# Patient Record
Sex: FEMALE | Race: WHITE | NOT HISPANIC OR LATINO | Employment: FULL TIME | URBAN - METROPOLITAN AREA
[De-identification: names, ages, dates, MRNs, and addresses within clinical notes are randomized per-mention and may not be internally consistent; named-entity substitution may affect disease eponyms.]

---

## 2021-05-19 LAB — BMD RECOMMENDATION EXT: NORMAL

## 2022-01-18 ENCOUNTER — OFFICE VISIT (OUTPATIENT)
Dept: FAMILY MEDICINE | Facility: CLINIC | Age: 57
End: 2022-01-18
Payer: COMMERCIAL

## 2022-01-18 VITALS
RESPIRATION RATE: 14 BRPM | HEART RATE: 70 BPM | OXYGEN SATURATION: 96 % | DIASTOLIC BLOOD PRESSURE: 68 MMHG | SYSTOLIC BLOOD PRESSURE: 106 MMHG | WEIGHT: 146.81 LBS | HEIGHT: 65 IN | BODY MASS INDEX: 24.46 KG/M2

## 2022-01-18 DIAGNOSIS — Z85.3 HISTORY OF BREAST CANCER: Primary | ICD-10-CM

## 2022-01-18 DIAGNOSIS — Z13.220 ENCOUNTER FOR LIPID SCREENING FOR CARDIOVASCULAR DISEASE: ICD-10-CM

## 2022-01-18 DIAGNOSIS — K75.4 AUTOIMMUNE HEPATITIS: ICD-10-CM

## 2022-01-18 DIAGNOSIS — Z76.89 ENCOUNTER TO ESTABLISH CARE WITH NEW DOCTOR: ICD-10-CM

## 2022-01-18 DIAGNOSIS — Z13.6 ENCOUNTER FOR LIPID SCREENING FOR CARDIOVASCULAR DISEASE: ICD-10-CM

## 2022-01-18 PROCEDURE — 99999 PR PBB SHADOW E&M-NEW PATIENT-LVL IV: CPT | Mod: PBBFAC,,, | Performed by: FAMILY MEDICINE

## 2022-01-18 PROCEDURE — 99203 PR OFFICE/OUTPT VISIT, NEW, LEVL III, 30-44 MIN: ICD-10-PCS | Mod: S$GLB,,, | Performed by: FAMILY MEDICINE

## 2022-01-18 PROCEDURE — 99203 OFFICE O/P NEW LOW 30 MIN: CPT | Mod: S$GLB,,, | Performed by: FAMILY MEDICINE

## 2022-01-18 PROCEDURE — 99999 PR PBB SHADOW E&M-NEW PATIENT-LVL IV: ICD-10-PCS | Mod: PBBFAC,,, | Performed by: FAMILY MEDICINE

## 2022-01-18 RX ORDER — PREDNISONE 5 MG/1
5 TABLET ORAL DAILY
Qty: 90 TABLET | Refills: 2 | Status: SHIPPED | OUTPATIENT
Start: 2022-01-18 | End: 2022-10-04 | Stop reason: SDUPTHER

## 2022-01-18 RX ORDER — ERGOCALCIFEROL 1.25 MG/1
3000 CAPSULE ORAL
COMMUNITY
End: 2022-01-18

## 2022-01-18 RX ORDER — ANASTROZOLE 1 MG/1
TABLET ORAL
COMMUNITY
End: 2022-03-02

## 2022-01-18 RX ORDER — CETIRIZINE HYDROCHLORIDE 10 MG/1
10 TABLET ORAL
COMMUNITY
End: 2022-09-15

## 2022-01-18 RX ORDER — PREDNISONE 5 MG/1
5 TABLET ORAL DAILY
COMMUNITY
Start: 2021-11-10 | End: 2022-01-18 | Stop reason: SDUPTHER

## 2022-01-18 RX ORDER — VIT C/E/ZN/COPPR/LUTEIN/ZEAXAN 250MG-90MG
2000 CAPSULE ORAL
COMMUNITY

## 2022-01-18 NOTE — PROGRESS NOTES
"[facility] - Clinic Note    Subjective      Ms. Hernandez is a 56 y.o. female who presents to clinic to establish care.     Patient recently moved from [place].   She has a history of breast cancer in 2011. Underwent chemo and bilateral mastectomy.   Took arimidex for 5 years and then stopped med for 2-3 years and took it again for another 2 years. Has stopped it now.    She has a history of osteoporosis due to prednisone use from auto-immune hepatitis.          PMH William has a past medical history of Allergy, Autoimmune hepatitis, Bulging lumbar disc, Cancer, and Osteoporosis.   PSXH William has a past surgical history that includes Appendectomy; Tonsillectomy; and masectomy.    William's family history is not on file.   SH William reports that she has never smoked. She has never used smokeless tobacco. She reports previous alcohol use. She reports that she does not use drugs.   ALG William is allergic to amoxicillin, cefuroxime axetil, and cephalexin.   MED William has a current medication list which includes the following prescription(s): calcium carbonate, cetirizine, cholecalciferol (vitamin d3), anastrozole, and prednisone.     Review of Systems   Constitutional: Negative for activity change, appetite change, chills, fatigue and fever.   Eyes: Negative for visual disturbance.   Respiratory: Negative for cough and shortness of breath.    Cardiovascular: Negative for chest pain, palpitations and leg swelling.   Gastrointestinal: Negative for abdominal pain, nausea and vomiting.   Skin: Negative for wound.   Neurological: Negative for dizziness and headaches.   Psychiatric/Behavioral: Negative for confusion.     Objective     /68 (BP Location: Right arm, Patient Position: Sitting, BP Method: Medium (Manual))   Pulse 70   Resp 14   Ht 5' 5" (1.651 m)   Wt 66.6 kg (146 lb 12.8 oz)   SpO2 96%   BMI 24.43 kg/m²     Physical Exam   Constitutional: She appears well-developed and well-nourished.  Non-toxic appearance. " She does not appear ill. No distress.   HENT:   Head: Normocephalic and atraumatic.   Eyes: Right eye exhibits no discharge. Left eye exhibits no discharge.   Cardiovascular: Normal rate, regular rhythm, normal heart sounds, intact distal pulses and normal pulses. Exam reveals no gallop and no friction rub.   No murmur heard.  Pulmonary/Chest: Effort normal and breath sounds normal. No respiratory distress. She has no wheezes. She has no rhonchi. She has no rales.   Abdominal: Normal appearance.   Musculoskeletal:      Cervical back: Neck supple.      Right lower leg: No edema.      Left lower leg: No edema.   Lymphadenopathy:     She has no cervical adenopathy.   Neurological: She is alert.   Skin: Skin is warm and dry. Capillary refill takes less than 2 seconds. She is not diaphoretic.   Psychiatric: She has a normal mood and affect. Her behavior is normal. Mood, judgment and thought content normal.   Vitals reviewed.     Assessment/Plan     William was seen today for establish care.    Diagnoses and all orders for this visit:  -New patient and new problem to me    History of breast cancer  -     Ambulatory referral/consult to Hematology / Oncology; Future    Autoimmune hepatitis  -     Ambulatory referral/consult to Gastroenterology; Future  -     predniSONE (DELTASONE) 5 MG tablet; Take 1 tablet (5 mg total) by mouth once daily.    Encounter for lipid screening for cardiovascular disease  -     Lipid Panel; Future    Encounter to establish care with new doctor        Follow up in about 6 months (around 7/18/2022), or if symptoms worsen or fail to improve.    Future Appointments   Date Time Provider Department Center   1/26/2022  8:00 AM LAB SCHEDULE, [facility] FAMILY [facility]IC[facility]   3/4/2022 10:40 AM Tanner France MD [facility] [place] [address]   7/19/2022  3:40 PM Marcia Storm MD [facility]       Marcia Storm MD  Family Medicine  [facility]

## 2022-01-19 ENCOUNTER — LAB VISIT (OUTPATIENT)
Dept: FAMILY MEDICINE | Facility: CLINIC | Age: 57
End: 2022-01-19
Payer: COMMERCIAL

## 2022-01-19 DIAGNOSIS — Z13.220 ENCOUNTER FOR LIPID SCREENING FOR CARDIOVASCULAR DISEASE: ICD-10-CM

## 2022-01-19 DIAGNOSIS — Z13.6 ENCOUNTER FOR LIPID SCREENING FOR CARDIOVASCULAR DISEASE: ICD-10-CM

## 2022-01-19 LAB
CHOLEST SERPL-MCNC: 181 MG/DL (ref 120–199)
CHOLEST/HDLC SERPL: 3.1 {RATIO} (ref 2–5)
HDLC SERPL-MCNC: 59 MG/DL (ref 40–75)
HDLC SERPL: 32.6 % (ref 20–50)
LDLC SERPL CALC-MCNC: 104.4 MG/DL (ref 63–159)
NONHDLC SERPL-MCNC: 122 MG/DL
TRIGL SERPL-MCNC: 88 MG/DL (ref 30–150)

## 2022-01-19 PROCEDURE — 80061 LIPID PANEL: CPT | Performed by: FAMILY MEDICINE

## 2022-01-20 ENCOUNTER — TELEPHONE (OUTPATIENT)
Dept: FAMILY MEDICINE | Facility: CLINIC | Age: 57
End: 2022-01-20
Payer: COMMERCIAL

## 2022-01-20 DIAGNOSIS — K75.4 AUTOIMMUNE HEPATITIS: Primary | ICD-10-CM

## 2022-01-20 NOTE — TELEPHONE ENCOUNTER
----- Message from Camille Najera sent at 1/20/2022  3:28 PM CST -----  Regarding: Returning Call  Who Called: pt          Who Left Message for Patient: Sayda Orellana LPN          Does the patient know what this is regarding: n/a          Best Call Back Number:[phone]

## 2022-01-20 NOTE — TELEPHONE ENCOUNTER
----- Message from Marcia Storm MD sent at 1/20/2022 12:26 PM CST -----  Your cholesterol panel is within normal limits.

## 2022-01-20 NOTE — TELEPHONE ENCOUNTER
Spoke with patient, notified of WNL Lipid. Patient is inquiring about having a liver panel done. Patient states that she thinks her last one was approx June or July of 2021.

## 2022-01-21 ENCOUNTER — TELEPHONE (OUTPATIENT)
Dept: FAMILY MEDICINE | Facility: CLINIC | Age: 57
End: 2022-01-21
Payer: COMMERCIAL

## 2022-01-26 ENCOUNTER — LAB VISIT (OUTPATIENT)
Dept: FAMILY MEDICINE | Facility: CLINIC | Age: 57
End: 2022-01-26
Payer: COMMERCIAL

## 2022-01-26 DIAGNOSIS — K75.4 AUTOIMMUNE HEPATITIS: ICD-10-CM

## 2022-01-26 LAB
ALBUMIN SERPL BCP-MCNC: 3.7 G/DL (ref 3.5–5.2)
ALP SERPL-CCNC: 48 U/L (ref 55–135)
ALT SERPL W/O P-5'-P-CCNC: 31 U/L (ref 10–44)
ANION GAP SERPL CALC-SCNC: 10 MMOL/L (ref 8–16)
AST SERPL-CCNC: 25 U/L (ref 10–40)
BASOPHILS # BLD AUTO: 0.03 K/UL (ref 0–0.2)
BASOPHILS NFR BLD: 0.5 % (ref 0–1.9)
BILIRUB SERPL-MCNC: 0.5 MG/DL (ref 0.1–1)
BUN SERPL-MCNC: 13 MG/DL (ref 6–20)
CALCIUM SERPL-MCNC: 8.7 MG/DL (ref 8.7–10.5)
CHLORIDE SERPL-SCNC: 107 MMOL/L (ref 95–110)
CO2 SERPL-SCNC: 26 MMOL/L (ref 23–29)
CREAT SERPL-MCNC: 0.8 MG/DL (ref 0.5–1.4)
DIFFERENTIAL METHOD: ABNORMAL
EOSINOPHIL # BLD AUTO: 0.2 K/UL (ref 0–0.5)
EOSINOPHIL NFR BLD: 2.9 % (ref 0–8)
ERYTHROCYTE [DISTWIDTH] IN BLOOD BY AUTOMATED COUNT: 13 % (ref 11.5–14.5)
EST. GFR  (AFRICAN AMERICAN): >60 ML/MIN/1.73 M^2
EST. GFR  (NON AFRICAN AMERICAN): >60 ML/MIN/1.73 M^2
GLUCOSE SERPL-MCNC: 113 MG/DL (ref 70–110)
HCT VFR BLD AUTO: 44.2 % (ref 37–48.5)
HGB BLD-MCNC: 14.2 G/DL (ref 12–16)
IMM GRANULOCYTES # BLD AUTO: 0.01 K/UL (ref 0–0.04)
IMM GRANULOCYTES NFR BLD AUTO: 0.2 % (ref 0–0.5)
LYMPHOCYTES # BLD AUTO: 1.8 K/UL (ref 1–4.8)
LYMPHOCYTES NFR BLD: 33.5 % (ref 18–48)
MCH RBC QN AUTO: 31.6 PG (ref 27–31)
MCHC RBC AUTO-ENTMCNC: 32.1 G/DL (ref 32–36)
MCV RBC AUTO: 98 FL (ref 82–98)
MONOCYTES # BLD AUTO: 0.5 K/UL (ref 0.3–1)
MONOCYTES NFR BLD: 9.3 % (ref 4–15)
NEUTROPHILS # BLD AUTO: 3 K/UL (ref 1.8–7.7)
NEUTROPHILS NFR BLD: 53.6 % (ref 38–73)
NRBC BLD-RTO: 0 /100 WBC
PLATELET # BLD AUTO: 238 K/UL (ref 150–450)
PMV BLD AUTO: 9.4 FL (ref 9.2–12.9)
POTASSIUM SERPL-SCNC: 4.2 MMOL/L (ref 3.5–5.1)
PROT SERPL-MCNC: 6.4 G/DL (ref 6–8.4)
RBC # BLD AUTO: 4.49 M/UL (ref 4–5.4)
SODIUM SERPL-SCNC: 143 MMOL/L (ref 136–145)
WBC # BLD AUTO: 5.5 K/UL (ref 3.9–12.7)

## 2022-01-26 PROCEDURE — 80053 COMPREHEN METABOLIC PANEL: CPT | Performed by: FAMILY MEDICINE

## 2022-01-26 PROCEDURE — 85025 COMPLETE CBC W/AUTO DIFF WBC: CPT | Performed by: FAMILY MEDICINE

## 2022-02-10 ENCOUNTER — TELEPHONE (OUTPATIENT)
Dept: FAMILY MEDICINE | Facility: CLINIC | Age: 57
End: 2022-02-10
Payer: COMMERCIAL

## 2022-02-10 DIAGNOSIS — T38.0X5A STEROID-INDUCED OSTEOPOROSIS: ICD-10-CM

## 2022-02-10 DIAGNOSIS — M81.8 STEROID-INDUCED OSTEOPOROSIS: ICD-10-CM

## 2022-02-10 NOTE — TELEPHONE ENCOUNTER
----- Message from Marcia Storm MD sent at 2/10/2022  9:49 AM CST -----  Electrolytes, kidney function, liver enzymes, and blood count is within normal limits. Your sugar level is borderline but not concerning at this time.

## 2022-02-10 NOTE — TELEPHONE ENCOUNTER
Spoke to patient about referrals. onc scheduled for next month.   Needs prolia injection.   Needs appt for GI.

## 2022-03-02 ENCOUNTER — OFFICE VISIT (OUTPATIENT)
Dept: GASTROENTEROLOGY | Facility: CLINIC | Age: 57
End: 2022-03-02
Payer: COMMERCIAL

## 2022-03-02 VITALS
OXYGEN SATURATION: 98 % | HEIGHT: 65 IN | WEIGHT: 146 LBS | RESPIRATION RATE: 14 BRPM | DIASTOLIC BLOOD PRESSURE: 67 MMHG | HEART RATE: 73 BPM | BODY MASS INDEX: 24.32 KG/M2 | SYSTOLIC BLOOD PRESSURE: 115 MMHG

## 2022-03-02 DIAGNOSIS — Z90.49 HISTORY OF APPENDECTOMY: Primary | ICD-10-CM

## 2022-03-02 DIAGNOSIS — K75.4 AUTOIMMUNE HEPATITIS: ICD-10-CM

## 2022-03-02 PROCEDURE — 99204 PR OFFICE/OUTPT VISIT, NEW, LEVL IV, 45-59 MIN: ICD-10-PCS | Mod: S$GLB,,, | Performed by: INTERNAL MEDICINE

## 2022-03-02 PROCEDURE — 99999 PR PBB SHADOW E&M-EST. PATIENT-LVL V: ICD-10-PCS | Mod: PBBFAC,,, | Performed by: INTERNAL MEDICINE

## 2022-03-02 PROCEDURE — 99999 PR PBB SHADOW E&M-EST. PATIENT-LVL V: CPT | Mod: PBBFAC,,, | Performed by: INTERNAL MEDICINE

## 2022-03-02 PROCEDURE — 99204 OFFICE O/P NEW MOD 45 MIN: CPT | Mod: S$GLB,,, | Performed by: INTERNAL MEDICINE

## 2022-03-02 NOTE — PROGRESS NOTES
Subjective:       Patient ID: William Hernandez is a 57 y.o. female.    Chief Complaint: Establish Care (Pt has auto immune hepititis. Wanting to est care since moving here x 16years)    Sixteen years ago she was evaluated for abnormal liver test.  She was living in [place] and saw multiple physicians including a hepatologist.  She had a liver biopsy.  She was told she had auto immune hepatitis.  She was tried on the budesonide which did not influence her elevated liver test.  She has been on Imuran which was stopped because of  decreased white count.  She has been on prednisone and was tapered from 20 mg to 5 mg per day.  Her recent CBC and CMP was normal.  She denies indigestion heartburn hematemesis hematochezia jaundice or bleeding.  She denies abdominal pain.  She generally has daily bowel movements.  Her grandmother had liver cancer.  In 2011 she was treated for breast cancer and has been in remission after surgery and therapy..  She has developed back pain and is being treated for osteoporosis.      Allergies:  Review of patient's allergies indicates:   Allergen Reactions    Amoxicillin Rash    Cefuroxime axetil Rash    Cephalexin      Other reaction(s): GI Intolerance  UPSET STOMACH  Other reaction(s): GI Intolerance  UPSET STOMACH         Medications:    Current Outpatient Medications:     calcium carbonate 1250 MG capsule, Take 1,250 mg by mouth., Disp: , Rfl:     cetirizine (ZYRTEC) 10 MG tablet, Take 10 mg by mouth., Disp: , Rfl:     cholecalciferol, vitamin D3, (VITAMIN D3) 25 mcg (1,000 unit) capsule, Take 2,000 Units by mouth., Disp: , Rfl:     predniSONE (DELTASONE) 5 MG tablet, Take 1 tablet (5 mg total) by mouth once daily., Disp: 90 tablet, Rfl: 2    Past Medical History:   Diagnosis Date    Allergy     Autoimmune hepatitis     Bulging lumbar disc     Cancer     Osteoporosis        Past Surgical History:   Procedure Laterality Date    APPENDECTOMY      masectomy      TONSILLECTOMY            Review of Systems   Constitutional: Negative for appetite change, fever and unexpected weight change.   HENT: Negative for trouble swallowing.         No jaundice.   Respiratory: Negative for cough, shortness of breath and wheezing.         He currently she is not using alcohol.  She denies dysphagia aspiration hemoptysis chronic cough chronic sputum production or dyspnea on exertion.   Cardiovascular: Negative for chest pain.        She denies exertional chest pain or rhythm disturbance.   Gastrointestinal: Negative for abdominal distention, abdominal pain, anal bleeding, blood in stool, constipation, diarrhea, nausea and rectal pain.        The medical records from [place] have been requested.  After her hepatology evaluation.  She will need to have upper endoscopy and colonoscopy.   Musculoskeletal: Positive for back pain. Negative for neck pain.        She complains of upper back pain.  She was evaluated by the nurse surgeon for the thoracic back pain.  She was told she had a bulging disc.  She was told she has severe osteoporosis.  She has been on calcium and she takes her vitamins and is undergoing therapy    Skin: Negative for pallor and rash.   Neurological: Negative for dizziness, seizures, syncope, speech difficulty, weakness and numbness.   Hematological: Negative for adenopathy.   Psychiatric/Behavioral: Negative for confusion.       Objective:      Physical Exam  Vitals reviewed.   Constitutional:       Appearance: She is well-developed.      Comments: Well-nourished well-hydrated afebrile nonicteric white female.  She is sitting comfortably in the chair.  She is breathing normally.  She is normocephalic.  Pupils are normal.  She appears to be oriented x3 and can relate her history and answer questions appropriately.   HENT:      Head: Normocephalic.   Eyes:      Pupils: Pupils are equal, round, and reactive to light.   Neck:      Thyroid: No thyromegaly.      Trachea: No tracheal deviation.    Cardiovascular:      Rate and Rhythm: Normal rate and regular rhythm.      Heart sounds: Normal heart sounds.   Pulmonary:      Effort: Pulmonary effort is normal.      Breath sounds: Normal breath sounds.   Abdominal:      General: Bowel sounds are normal. There is no distension.      Palpations: Abdomen is soft. There is no mass.      Tenderness: There is no abdominal tenderness. There is no right CVA tenderness, left CVA tenderness, guarding or rebound.      Hernia: No hernia is present.      Comments: The abdomen is soft without tenderness masses or organomegaly.  Bowel sounds are normal   Musculoskeletal:         General: Normal range of motion.      Cervical back: Normal range of motion and neck supple.      Comments: She can ambulate normally.  She can go from the sitting the standing position without difficulty.  She can get on the exam table without difficulty or assistance.   Lymphadenopathy:      Cervical: No cervical adenopathy.   Skin:     General: Skin is warm and dry.   Neurological:      Mental Status: She is alert and oriented to person, place, and time.      Cranial Nerves: No cranial nerve deficit.   Psychiatric:         Behavior: Behavior normal.           Plan:       History of appendectomy    Autoimmune hepatitis  -     Ambulatory referral/consult to Gastroenterology  -     Hepatitis Panel, Acute; Future; Expected date: 03/02/2022  -     US Abdomen Complete; Future; Expected date: 03/02/2022  -     Ambulatory referral/consult to Hepatology; Future; Expected date: 03/09/2022    She will continue her nutritious diet with adequate fiber and  vitamins including the calcium and vitamin-D..  She continues her current medications.  She may need to see the endocrinologist or osteoporosis specialist if she continues on the prednisone.  She is referred to the hepatologist.  She will make a food diary and avoid the offending foods.

## 2022-03-04 ENCOUNTER — LAB VISIT (OUTPATIENT)
Dept: LAB | Facility: HOSPITAL | Age: 57
End: 2022-03-04
Attending: FAMILY MEDICINE
Payer: COMMERCIAL

## 2022-03-04 ENCOUNTER — OFFICE VISIT (OUTPATIENT)
Dept: HEMATOLOGY/ONCOLOGY | Facility: CLINIC | Age: 57
End: 2022-03-04
Payer: COMMERCIAL

## 2022-03-04 ENCOUNTER — TELEPHONE (OUTPATIENT)
Dept: HEMATOLOGY/ONCOLOGY | Facility: CLINIC | Age: 57
End: 2022-03-04

## 2022-03-04 VITALS
SYSTOLIC BLOOD PRESSURE: 115 MMHG | BODY MASS INDEX: 23.78 KG/M2 | DIASTOLIC BLOOD PRESSURE: 68 MMHG | HEART RATE: 73 BPM | HEIGHT: 66 IN | OXYGEN SATURATION: 99 % | WEIGHT: 148 LBS

## 2022-03-04 DIAGNOSIS — K75.4 AUTOIMMUNE HEPATITIS: ICD-10-CM

## 2022-03-04 DIAGNOSIS — T38.6X5A OSTEOPOROSIS DUE TO AROMATASE INHIBITOR: ICD-10-CM

## 2022-03-04 DIAGNOSIS — M81.8 OSTEOPOROSIS DUE TO AROMATASE INHIBITOR: ICD-10-CM

## 2022-03-04 DIAGNOSIS — Z85.3 HISTORY OF BREAST CANCER: ICD-10-CM

## 2022-03-04 DIAGNOSIS — Z85.3 HISTORY OF BREAST CANCER: Primary | ICD-10-CM

## 2022-03-04 DIAGNOSIS — Z79.52 CURRENT CHRONIC USE OF SYSTEMIC STEROIDS: ICD-10-CM

## 2022-03-04 DIAGNOSIS — N89.8 VAGINAL DRYNESS: Primary | ICD-10-CM

## 2022-03-04 DIAGNOSIS — N94.19 DYSPAREUNIA DUE TO MEDICAL CONDITION IN FEMALE: ICD-10-CM

## 2022-03-04 LAB
ALBUMIN SERPL BCP-MCNC: 4 G/DL (ref 3.5–5.2)
ALP SERPL-CCNC: 59 U/L (ref 55–135)
ALT SERPL W/O P-5'-P-CCNC: 35 U/L (ref 10–44)
ANION GAP SERPL CALC-SCNC: 10 MMOL/L (ref 8–16)
AST SERPL-CCNC: 28 U/L (ref 10–40)
BASOPHILS # BLD AUTO: 0.04 K/UL (ref 0–0.2)
BASOPHILS NFR BLD: 0.7 % (ref 0–1.9)
BILIRUB SERPL-MCNC: 0.4 MG/DL (ref 0.1–1)
BUN SERPL-MCNC: 17 MG/DL (ref 6–20)
CALCIUM SERPL-MCNC: 9.5 MG/DL (ref 8.7–10.5)
CHLORIDE SERPL-SCNC: 107 MMOL/L (ref 95–110)
CO2 SERPL-SCNC: 26 MMOL/L (ref 23–29)
CREAT SERPL-MCNC: 0.8 MG/DL (ref 0.5–1.4)
DIFFERENTIAL METHOD: ABNORMAL
EOSINOPHIL # BLD AUTO: 0.1 K/UL (ref 0–0.5)
EOSINOPHIL NFR BLD: 1.1 % (ref 0–8)
ERYTHROCYTE [DISTWIDTH] IN BLOOD BY AUTOMATED COUNT: 13.2 % (ref 11.5–14.5)
EST. GFR  (AFRICAN AMERICAN): >60 ML/MIN/1.73 M^2
EST. GFR  (NON AFRICAN AMERICAN): >60 ML/MIN/1.73 M^2
GLUCOSE SERPL-MCNC: 91 MG/DL (ref 70–110)
HCT VFR BLD AUTO: 44 % (ref 37–48.5)
HGB BLD-MCNC: 14 G/DL (ref 12–16)
IMM GRANULOCYTES # BLD AUTO: 0.02 K/UL (ref 0–0.04)
IMM GRANULOCYTES NFR BLD AUTO: 0.4 % (ref 0–0.5)
LDH SERPL L TO P-CCNC: 162 U/L (ref 110–260)
LYMPHOCYTES # BLD AUTO: 1 K/UL (ref 1–4.8)
LYMPHOCYTES NFR BLD: 17.4 % (ref 18–48)
MCH RBC QN AUTO: 31.6 PG (ref 27–31)
MCHC RBC AUTO-ENTMCNC: 31.8 G/DL (ref 32–36)
MCV RBC AUTO: 99 FL (ref 82–98)
MONOCYTES # BLD AUTO: 0.3 K/UL (ref 0.3–1)
MONOCYTES NFR BLD: 6.2 % (ref 4–15)
NEUTROPHILS # BLD AUTO: 4.1 K/UL (ref 1.8–7.7)
NEUTROPHILS NFR BLD: 74.2 % (ref 38–73)
NRBC BLD-RTO: 0 /100 WBC
PLATELET # BLD AUTO: 255 K/UL (ref 150–450)
PMV BLD AUTO: 9 FL (ref 9.2–12.9)
POTASSIUM SERPL-SCNC: 4.4 MMOL/L (ref 3.5–5.1)
PROT SERPL-MCNC: 6.9 G/DL (ref 6–8.4)
RBC # BLD AUTO: 4.43 M/UL (ref 4–5.4)
SODIUM SERPL-SCNC: 143 MMOL/L (ref 136–145)
WBC # BLD AUTO: 5.52 K/UL (ref 3.9–12.7)

## 2022-03-04 PROCEDURE — 99205 PR OFFICE/OUTPT VISIT, NEW, LEVL V, 60-74 MIN: ICD-10-PCS | Mod: S$GLB,,, | Performed by: INTERNAL MEDICINE

## 2022-03-04 PROCEDURE — 83615 LACTATE (LD) (LDH) ENZYME: CPT | Performed by: INTERNAL MEDICINE

## 2022-03-04 PROCEDURE — 99999 PR PBB SHADOW E&M-EST. PATIENT-LVL V: CPT | Mod: PBBFAC,,, | Performed by: INTERNAL MEDICINE

## 2022-03-04 PROCEDURE — 99999 PR PBB SHADOW E&M-EST. PATIENT-LVL V: ICD-10-PCS | Mod: PBBFAC,,, | Performed by: INTERNAL MEDICINE

## 2022-03-04 PROCEDURE — 80053 COMPREHEN METABOLIC PANEL: CPT | Performed by: INTERNAL MEDICINE

## 2022-03-04 PROCEDURE — 36415 COLL VENOUS BLD VENIPUNCTURE: CPT | Performed by: INTERNAL MEDICINE

## 2022-03-04 PROCEDURE — 85025 COMPLETE CBC W/AUTO DIFF WBC: CPT | Performed by: INTERNAL MEDICINE

## 2022-03-04 PROCEDURE — 99205 OFFICE O/P NEW HI 60 MIN: CPT | Mod: S$GLB,,, | Performed by: INTERNAL MEDICINE

## 2022-03-04 NOTE — PROGRESS NOTES
Chief complaint:  History of breast carcinoma    History of present illness:  Patient is a 57-year-old white female diagnosed with triple positive right breast carcinoma in . Patient had T1, N1, M0 disease and was treated with 6 cycles of neoadjuvant TCH.  Patient went on to have bilateral mastectomy.  I am unaware as to what her postoperative surgical pathology revealed.  Patient has had bilateral implant reconstruction x2.  The 2nd surgery was performed due to poor cosmetic outcome following the 1st reconstruction.  Patient reports she had pathologic CR both in the breast and the lymph nodes.  Patient did not receive postoperative adjuvant XRT.  She did complete 1 year of Herceptin and 10 years of Arimidex.  Patient has developed osteoporosis after therapy and is chronically on calcium supplementation with vitamin-D as well as biannual Prolia.  Last bone mineral density was obtained approximately 6 months ago and was consistent with osteoporosis.  Patient has severe difficulty with vaginal dryness and dyspareunia despite use of lubricants such as Replens as well as 3 times per week Premarin cream.     Past medical history:  1. Breast carcinoma as detailed above  2. Steroid induced osteoporosis  3. Autoimmune hepatitis  4. Status post   5. Status post endometrial ablation  6. Status post tubal ligation  7. Status post appendectomy  8. Status post tonsillectomy    Allergies:  1. Beta lactams    Medications:  1. Calcium carbonate 1250 mg p.o. daily  2. Zyrtec 10 mg p.o. daily  3. Vitamin-D 2000 units p.o. daily  4. Prednisone 5 mg p.o. daily  5. Premarin cream topically 3 times per week    Family/social history:  No ongoing tobacco or alcohol abuse.  No family history of breast or ovarian carcinoma.    Patient's paternal grandmother suffered from pancreatic carcinoma.  Patient's maternal grandfather suffer from esophageal carcinoma.    Physical examination:  Well-developed, well-nourished, white female,  no acute distress, who has a weight of 148 lb.  VITAL SIGNS: Documented  and reviewed this visit.  HEENT: Normocephalic, atraumatic. Oral mucosa pink and moist. Lips without lesions. Tongue midline. Oropharynx clear. Nonicteric sclerae.   NECK: Supple, no adenopathy. No carotid bruits, thyromegaly or thyroid nodule.   HEART: Regular rate and rhythm without murmur, gallop or rub.   LUNGS: Clear to auscultation bilaterally. Normal respiratory effort.   ABDOMEN: Soft, nontender, nondistended with positive normoactive bowel sounds, no hepatosplenomegaly.   EXTREMITIES: No cyanosis, clubbing or edema. Distal pulses are intact.   AXILLAE AND GROIN: No palpable pathologic lymphadenopathy is appreciated.   SKIN: Intact/turgor normal   NEUROLOGIC: Cranial nerves II-XII grossly intact. Motor: Good muscle bulk and tone. Strength/sensory 5/5 throughout. Gait stable.   BREASTS:  Both surgically reconstructed with implants and free from signs of local recurrence.    Laboratory:  Most recent studies were obtained 01/26/2022.  White count 5.5, hemoglobin 14.2, hematocrit 44.2, platelets 238, absolute neutrophil count 3000.  Sodium 143, potassium 4.2, chloride 107, CO2 26, BUN 13, creatinine 0.8, glucose 113, calcium 8.7, liver function test within normal limits, GFR is greater than 60.    Impression:  1. History of triple positive right breast carcinoma-no evidence of active disease.  2. Vaginal dryness/dyspareunia following long-term use of aromatase inhibitor.  3. Autoimmune hepatitis-steroid dependent.  4. Osteoporosis-on calcium supplementation/vitamin-D/biannual Prolia.      Plan:  1. Baseline studies to include CBC, CMP, LDH, chest x-ray now and review results by phone.    2. Return to clinic in 1 year with interval CBC, CMP, LDH, and chest x-ray.  3. Refer to Dr. Yoanna Lobo for long-term Gynecology care following treatment of breast carcinoma.  4. Continue calcium supplementation with vitamin-D/Prolia for management  of osteoporosis.    This note was created using voice recognition software and may contain grammatical errors.

## 2022-03-04 NOTE — LETTER
March 4, 2022        Marcia Storm MD  [address] [facility] [place] [address]             [facility]  [address] [place] [address]  Phone: [phone]   Patient: William Hernandez   MR Number: [id number]   YOB: 1965   Date of Visit: 3/4/2022       Dear Dr. Storm:    Thank you for referring William Hernandez to me for evaluation of history of triple positive right breast cancer. Below are the relevant portions of my assessment and plan of care.  If you have questions, please do not hesitate to call me. I look forward to following William along with you.    Sincerely,      Tanner France MD           CC  No Recipients

## 2022-03-04 NOTE — Clinical Note
Obtain stat CBC, CMP, LDH, and chest x-ray.  Phone review results. Return to clinic in 1 year with interval CBC, CMP, LDH, chest x-ray. Refer to Dr. Yoanna Lobo Gynecology in [facility] for long-term care following treatment of breast cancer.

## 2022-03-07 ENCOUNTER — HOSPITAL ENCOUNTER (OUTPATIENT)
Dept: RADIOLOGY | Facility: HOSPITAL | Age: 57
Discharge: HOME | End: 2022-03-07
Attending: INTERNAL MEDICINE
Payer: COMMERCIAL

## 2022-03-07 DIAGNOSIS — Z85.3 HISTORY OF BREAST CANCER: ICD-10-CM

## 2022-03-07 PROCEDURE — 71046 X-RAY EXAM CHEST 2 VIEWS: CPT | Mod: TC

## 2022-03-07 PROCEDURE — 71046 X-RAY EXAM CHEST 2 VIEWS: CPT | Mod: 26,,, | Performed by: RADIOLOGY

## 2022-03-07 PROCEDURE — 71046 XR CHEST PA AND LATERAL: ICD-10-PCS | Mod: 26,,, | Performed by: RADIOLOGY

## 2022-03-08 ENCOUNTER — TELEPHONE (OUTPATIENT)
Dept: HEMATOLOGY/ONCOLOGY | Facility: CLINIC | Age: 57
End: 2022-03-08
Payer: COMMERCIAL

## 2022-03-09 ENCOUNTER — TELEPHONE (OUTPATIENT)
Dept: HEMATOLOGY/ONCOLOGY | Facility: CLINIC | Age: 57
End: 2022-03-09
Payer: COMMERCIAL

## 2022-03-09 NOTE — TELEPHONE ENCOUNTER
Called Dr Yoanna Lobo office to inquire if they received referral that was faxed.  stated she had not. Pt's insurance info provided. She stated she would call pt and get her scheduled.

## 2022-03-24 ENCOUNTER — TELEPHONE (OUTPATIENT)
Dept: HEMATOLOGY/ONCOLOGY | Facility: CLINIC | Age: 57
End: 2022-03-24
Payer: COMMERCIAL

## 2022-03-24 NOTE — TELEPHONE ENCOUNTER
----- Message from Lupe Turk MA sent at 3/24/2022  3:54 PM CDT -----  Contact: BRENDA TAMEZ [[id number]]  Type: Needs Medical Advice    Who Called: BRENDA TAMEZ [[id number]]  Best Call Back Number: [phone]  Inquiry/Question: Would you kindly call BRENDA TAMEZ [[id number]] regarding missed call from the clinic  Thank you~

## 2022-03-25 ENCOUNTER — TELEPHONE (OUTPATIENT)
Dept: HEMATOLOGY/ONCOLOGY | Facility: CLINIC | Age: 57
End: 2022-03-25
Payer: COMMERCIAL

## 2022-03-25 NOTE — TELEPHONE ENCOUNTER
Called pt per MD orders. Pt v/u of normal findings. Pt made aware of upcoming scheduled appts for next year.     ----- Message from Tanner France MD sent at 3/25/2022  7:51 AM CDT -----  Regarding: RE: test results  Reviewed; please call and let her know that lab an cxr looked fine; keep one year follow up as planned.  ----- Message -----  From: Fara Parra RN  Sent: 3/24/2022   4:12 PM CDT  To: Tanner France MD  Subject: test results                                     Please phone call pt to review labs and CXR when you get a moment. Thank you.

## 2022-03-30 ENCOUNTER — HOSPITAL ENCOUNTER (OUTPATIENT)
Dept: RADIOLOGY | Facility: HOSPITAL | Age: 57
Discharge: HOME | End: 2022-03-30
Attending: INTERNAL MEDICINE
Payer: COMMERCIAL

## 2022-03-30 ENCOUNTER — INFUSION (OUTPATIENT)
Dept: INFUSION THERAPY | Facility: HOSPITAL | Age: 57
End: 2022-03-30
Attending: INTERNAL MEDICINE
Payer: COMMERCIAL

## 2022-03-30 VITALS
HEIGHT: 66 IN | DIASTOLIC BLOOD PRESSURE: 60 MMHG | SYSTOLIC BLOOD PRESSURE: 133 MMHG | HEART RATE: 65 BPM | OXYGEN SATURATION: 98 % | HEART RATE: 65 BPM | OXYGEN SATURATION: 98 % | WEIGHT: 145.5 LBS | RESPIRATION RATE: 20 BRPM | TEMPERATURE: 98 F | BODY MASS INDEX: 23.38 KG/M2 | TEMPERATURE: 98 F | RESPIRATION RATE: 20 BRPM | SYSTOLIC BLOOD PRESSURE: 133 MMHG | DIASTOLIC BLOOD PRESSURE: 66 MMHG

## 2022-03-30 DIAGNOSIS — K75.4 AUTOIMMUNE HEPATITIS: ICD-10-CM

## 2022-03-30 DIAGNOSIS — T38.6X5A OSTEOPOROSIS DUE TO AROMATASE INHIBITOR: Primary | ICD-10-CM

## 2022-03-30 DIAGNOSIS — M81.8 OSTEOPOROSIS DUE TO AROMATASE INHIBITOR: Primary | ICD-10-CM

## 2022-03-30 PROCEDURE — 76700 US ABDOMEN COMPLETE: ICD-10-PCS | Mod: 26,,, | Performed by: RADIOLOGY

## 2022-03-30 PROCEDURE — 76700 US EXAM ABDOM COMPLETE: CPT | Mod: 26,,, | Performed by: RADIOLOGY

## 2022-03-30 PROCEDURE — 96372 THER/PROPH/DIAG INJ SC/IM: CPT

## 2022-03-30 PROCEDURE — 76700 US EXAM ABDOM COMPLETE: CPT | Mod: TC

## 2022-03-30 PROCEDURE — 63600175 PHARM REV CODE 636 W HCPCS: Mod: JG | Performed by: FAMILY MEDICINE

## 2022-03-30 RX ADMIN — DENOSUMAB 60 MG: 60 INJECTION SUBCUTANEOUS at 08:03

## 2022-03-30 NOTE — PLAN OF CARE
"Problem: Adult Inpatient Plan of Care  Goal: Plan of Care Review  Outcome: Ongoing, Progressing  Flowsheets (Taken 3/30/2022 0854)  Plan of Care Reviewed With: patient  /66 (Patient Position: Sitting)   Pulse 65   Temp 98.1 °F (36.7 °C)   Resp 20   Ht 5' 6" (1.676 m)   Wt 66 kg (145 lb 8.1 oz)   SpO2 98%   BMI 23.48 kg/m²   Patient arrived to OPT for Prolia Injection and identified by name and  with appropriate and oriented behavior observed. VSS and all questions and concerns addressed, and next appointment for Prolia injection scheduled in 6 months. Prolia administered to left upper arm, band-aide applied and patient tolerated well. Patient discharged from OPT per self, and no acute or respiratory distress observed.     "

## 2022-05-31 ENCOUNTER — PATIENT MESSAGE (OUTPATIENT)
Dept: ADMINISTRATIVE | Facility: HOSPITAL | Age: 57
End: 2022-05-31
Payer: COMMERCIAL

## 2022-08-24 ENCOUNTER — PATIENT MESSAGE (OUTPATIENT)
Dept: ADMINISTRATIVE | Facility: HOSPITAL | Age: 57
End: 2022-08-24
Payer: COMMERCIAL

## 2022-09-01 ENCOUNTER — PATIENT MESSAGE (OUTPATIENT)
Dept: HEMATOLOGY/ONCOLOGY | Facility: CLINIC | Age: 57
End: 2022-09-01
Payer: COMMERCIAL

## 2022-09-14 NOTE — PROGRESS NOTES
"   [facility] Consultation Note    Reason for Consult:  The encounter diagnosis was Autoimmune hepatitis.    PCP: Marcia Storm   [address] / [place] [address]    HPI:  This is a 57 y.o. female here for evaluation of:  Autoimmune hepatitis    No outside records available for this referral.  In Care Everywhere, there is a mention of liver biopsy in 2013 but report not available.  Has had 3-4 subsequent biopsies.  They have shown improvement, but patient needs to get reports from [place].     Care Everywhere:   CT report of liver biopsy procedure is in [facility], but no biopsy report  Labs are from 2017, 2018:  ALT 53-60. AST normal.    Results of labs on 3/4/22 in epic show following:  LFTs normal, Plt ct normal.      Ultrasound March 30, 2022:   1. Normal gallbladder ultrasound without evidence for cholelithiasis or cholecystitis.  2. Small bilateral renal cysts.  3. Small nonobstructing left renal calculus.    Interviewed after above info obtained from records:  Has had AIH x 17 yrs (2005), and has been on prednisone for 17 yrs.  Was tried on Imuran but had leucopenia, requiring stopping discontinuation of imuran.  Dose of pred was 20 mg initially, then was tapered down to 5 mg daily, for the last 4 yrs, on 10 mg/d for 15 yrs, and 20 taper over initial 2 yrs.   Patient had "really bad virus with URI" just before discovery  of AIH.  Had delivered baby 4 yrs prior to dx of AIH.  Has taken abx for cystitis, could be nitrofurantoin during pregnancy, did not take aldomet, never on statin.       Elevated liver enzymes: Yes -historically both AST and ALT elevations, but of late, ALT elevation with normal AST  Abnormal imaging: No  Cirrhosis: No  Hepatitis C: No  Hepatitis B: No  Fatty liver: No  Encephalopathy: No  Post-hospital discharge: No  Symptoms: None    Primary hepatic manifestations:  Fatigue:No  Edema:No  Ascites:No  Encephalopathy:No  Abdominal pain:Yes initial presentation of AIH, " gone for a long time  GI bleeds: No  Pruritus:Yes - was terrible with onset of AIH, stopped when prednisone started  Weight Changes:No  Changes in Bowel habits: No  Muscle cramps:No    Risk factors for liver disease:  No jaundice  No transfusions  No IVDU  Did not snort cocaine or similar agents  Did not live with anyone with hepatitis B or C  Sexual partner not tested  hepatotoxic medications - possibly nitrofurantoin  No exposure to industrial toxins  Alcohol: None.       ROS:  Constitutional: No fevers, chills, weight changes, fatigue  ENT: No allergies, nosebleeds,   CV: No chest pain  Pulm: No cough, shortness of breath  Ophtho: No vision changes  GI/Liver: see HPI  Derm: No rash, itching  Heme: No swollen glands, bruising  MSK: No joint pains, joint swelling  : No dysuria, hematuria, decrease in urine output  Endo: No hot or cold intolerance  Neuro: No confusion, disorientation, difficulty with sleep, memory, concentration, syncope, seizure  Psych: No anxiety, depression    Medical History:  has a past medical history of Allergy, Autoimmune hepatitis, Bulging lumbar disc, Cancer, and Osteoporosis.    Surgical History:  has a past surgical history that includes Appendectomy; Tonsillectomy; and masectomy.    Family History: family history includes Irritable bowel syndrome in her mother..     Social History:  reports that she has never smoked. She has never used smokeless tobacco. She reports that she does not currently use alcohol. She reports that she does not use drugs.    Review of patient's allergies indicates:   Allergen Reactions    Amoxicillin Rash    Cefuroxime axetil Rash    Cephalexin      Other reaction(s): GI Intolerance  UPSET STOMACH  Other reaction(s): GI Intolerance  UPSET STOMACH         Current Outpatient Rx   Medication Sig Dispense Refill    calcium carbonate 1250 MG capsule Take 1,250 mg by mouth.      cholecalciferol, vitamin D3, (VITAMIN D3) 25 mcg (1,000 unit) capsule Take 2,000 Units  "by mouth.      loratadine 10 mg Cap       multivitamin capsule Take 1 capsule by mouth once daily.      predniSONE (DELTASONE) 5 MG tablet Take 1 tablet (5 mg total) by mouth once daily. 90 tablet 2    vit C/Zn gluc/herbal no.325 (ELDERBERRY ZINC VIT C MM)          Objective Findings:    Vital Signs:  /73 (BP Location: Left arm, Patient Position: Sitting, BP Method: Medium (Automatic))   Pulse 71   Temp 98.3 °F (36.8 °C) (Oral)   Resp 18   Ht 5' 6" (1.676 m)   Wt 66.6 kg (146 lb 13.2 oz)   SpO2 99%   BMI 23.70 kg/m²   Body mass index is 23.7 kg/m².    Physical Exam:  General Appearance: Well appearing in no acute distress  Head:   Normocephalic, without obvious abnormality  Eyes:    No scleral icterus, EOMI  ENT: Neck supple, Lips, mucosa, and tongue normal; teeth and gums normal  Lungs: CTA bilaterally in anterior and posterior fields, no wheezes, no crackles.  Heart:  Regular rate and rhythm, S1, S2 normal, no murmurs heard  Abdomen: Soft, non tender, non distended with positive bowel sounds in all four quadrants. No hepatosplenomegaly, ascites, or mass  Extremities: 2+ pulses, no clubbing, cyanosis or edema  Skin: No rash  Neurologic: CN II-XII intact, alert, oriented x 3. No asterixis      Labs:  Lab Results   Component Value Date    WBC 5.52 03/04/2022    HGB 14.0 03/04/2022    HCT 44.0 03/04/2022     03/04/2022    CHOL 181 01/19/2022    TRIG 88 01/19/2022    HDL 59 01/19/2022    CREATININE 0.8 03/04/2022    BUN 17 03/04/2022    BILITOT 0.4 03/04/2022    ALT 35 03/04/2022    AST 28 03/04/2022    ALKPHOS 59 03/04/2022     03/04/2022    K 4.4 03/04/2022     03/04/2022    CO2 26 03/04/2022       Imaging:       Endoscopy:      Assessment:  1. Autoimmune hepatitis    May continue the prednisone, for now.   Get biopsy slides from [place] (latest 2013 is a must, but all others would be great), and reports of biopsies.    Fibroscan yearly. - get old report from " [place]      Recommendations:  -  Fibroscan yearly, start now  -  Labs every 6 months, start now:  CBC, CMP, PT INR  -  Continue current meds  -  Avoid alcohol, smoking, sedatives and meds with codeine.  -  Avoid high intake of Tylenol (more than 4 extra-strength pills in one day)  -  Return in 6 months.       No follow-ups on file.      Order summary:  No orders of the defined types were placed in this encounter.      Thank you so much for allowing me to participate in the care of William Swift MD

## 2022-09-15 ENCOUNTER — LAB VISIT (OUTPATIENT)
Dept: FAMILY MEDICINE | Facility: CLINIC | Age: 57
End: 2022-09-15
Payer: COMMERCIAL

## 2022-09-15 ENCOUNTER — OFFICE VISIT (OUTPATIENT)
Dept: HEPATOLOGY | Facility: CLINIC | Age: 57
End: 2022-09-15
Payer: COMMERCIAL

## 2022-09-15 VITALS
DIASTOLIC BLOOD PRESSURE: 73 MMHG | OXYGEN SATURATION: 99 % | HEART RATE: 71 BPM | SYSTOLIC BLOOD PRESSURE: 113 MMHG | HEIGHT: 66 IN | RESPIRATION RATE: 18 BRPM | TEMPERATURE: 98 F | WEIGHT: 146.81 LBS | BODY MASS INDEX: 23.59 KG/M2

## 2022-09-15 DIAGNOSIS — K75.4 AUTOIMMUNE HEPATITIS: ICD-10-CM

## 2022-09-15 LAB
ALBUMIN SERPL BCP-MCNC: 3.7 G/DL (ref 3.5–5.2)
ALP SERPL-CCNC: 57 U/L (ref 55–135)
ALT SERPL W/O P-5'-P-CCNC: 30 U/L (ref 10–44)
ANION GAP SERPL CALC-SCNC: 10 MMOL/L (ref 8–16)
AST SERPL-CCNC: 26 U/L (ref 10–40)
BASOPHILS # BLD AUTO: 0.03 K/UL (ref 0–0.2)
BASOPHILS NFR BLD: 0.5 % (ref 0–1.9)
BILIRUB SERPL-MCNC: 0.4 MG/DL (ref 0.1–1)
BUN SERPL-MCNC: 15 MG/DL (ref 6–20)
CALCIUM SERPL-MCNC: 9.1 MG/DL (ref 8.7–10.5)
CHLORIDE SERPL-SCNC: 106 MMOL/L (ref 95–110)
CO2 SERPL-SCNC: 25 MMOL/L (ref 23–29)
CREAT SERPL-MCNC: 0.8 MG/DL (ref 0.5–1.4)
DIFFERENTIAL METHOD: ABNORMAL
EOSINOPHIL # BLD AUTO: 0 K/UL (ref 0–0.5)
EOSINOPHIL NFR BLD: 0.5 % (ref 0–8)
ERYTHROCYTE [DISTWIDTH] IN BLOOD BY AUTOMATED COUNT: 13.2 % (ref 11.5–14.5)
EST. GFR  (NO RACE VARIABLE): >60 ML/MIN/1.73 M^2
GLUCOSE SERPL-MCNC: 192 MG/DL (ref 70–110)
HCT VFR BLD AUTO: 42.2 % (ref 37–48.5)
HGB BLD-MCNC: 13.7 G/DL (ref 12–16)
IMM GRANULOCYTES # BLD AUTO: 0.02 K/UL (ref 0–0.04)
IMM GRANULOCYTES NFR BLD AUTO: 0.3 % (ref 0–0.5)
INR PPP: 1.2 (ref 0.8–1.2)
LYMPHOCYTES # BLD AUTO: 1.2 K/UL (ref 1–4.8)
LYMPHOCYTES NFR BLD: 17.9 % (ref 18–48)
MCH RBC QN AUTO: 31.1 PG (ref 27–31)
MCHC RBC AUTO-ENTMCNC: 32.5 G/DL (ref 32–36)
MCV RBC AUTO: 96 FL (ref 82–98)
MONOCYTES # BLD AUTO: 0.3 K/UL (ref 0.3–1)
MONOCYTES NFR BLD: 4.6 % (ref 4–15)
NEUTROPHILS # BLD AUTO: 5 K/UL (ref 1.8–7.7)
NEUTROPHILS NFR BLD: 76.2 % (ref 38–73)
NRBC BLD-RTO: 0 /100 WBC
PLATELET # BLD AUTO: 240 K/UL (ref 150–450)
PMV BLD AUTO: 9.5 FL (ref 9.2–12.9)
POTASSIUM SERPL-SCNC: 3.9 MMOL/L (ref 3.5–5.1)
PROT SERPL-MCNC: 6.3 G/DL (ref 6–8.4)
PROTHROMBIN TIME: 12.1 SEC (ref 9–12.5)
RBC # BLD AUTO: 4.4 M/UL (ref 4–5.4)
SODIUM SERPL-SCNC: 141 MMOL/L (ref 136–145)
WBC # BLD AUTO: 6.58 K/UL (ref 3.9–12.7)

## 2022-09-15 PROCEDURE — 99205 OFFICE O/P NEW HI 60 MIN: CPT | Mod: S$GLB,,, | Performed by: INTERNAL MEDICINE

## 2022-09-15 PROCEDURE — 80074 ACUTE HEPATITIS PANEL: CPT | Performed by: INTERNAL MEDICINE

## 2022-09-15 PROCEDURE — 99205 PR OFFICE/OUTPT VISIT, NEW, LEVL V, 60-74 MIN: ICD-10-PCS | Mod: S$GLB,,, | Performed by: INTERNAL MEDICINE

## 2022-09-15 PROCEDURE — 99999 PR PBB SHADOW E&M-EST. PATIENT-LVL III: CPT | Mod: PBBFAC,,, | Performed by: INTERNAL MEDICINE

## 2022-09-15 PROCEDURE — 85025 COMPLETE CBC W/AUTO DIFF WBC: CPT | Performed by: INTERNAL MEDICINE

## 2022-09-15 PROCEDURE — 85610 PROTHROMBIN TIME: CPT | Performed by: INTERNAL MEDICINE

## 2022-09-15 PROCEDURE — 99999 PR PBB SHADOW E&M-EST. PATIENT-LVL III: ICD-10-PCS | Mod: PBBFAC,,, | Performed by: INTERNAL MEDICINE

## 2022-09-15 PROCEDURE — 80053 COMPREHEN METABOLIC PANEL: CPT | Performed by: INTERNAL MEDICINE

## 2022-09-16 LAB
HAV IGM SERPL QL IA: NORMAL
HBV CORE IGM SERPL QL IA: NORMAL
HBV SURFACE AG SERPL QL IA: NORMAL
HCV AB SERPL QL IA: NORMAL

## 2022-09-19 ENCOUNTER — OFFICE VISIT (OUTPATIENT)
Dept: GASTROENTEROLOGY | Facility: CLINIC | Age: 57
End: 2022-09-19
Payer: COMMERCIAL

## 2022-09-19 ENCOUNTER — PATIENT MESSAGE (OUTPATIENT)
Dept: HEPATOLOGY | Facility: CLINIC | Age: 57
End: 2022-09-19
Payer: COMMERCIAL

## 2022-09-19 VITALS
SYSTOLIC BLOOD PRESSURE: 108 MMHG | WEIGHT: 146.94 LBS | DIASTOLIC BLOOD PRESSURE: 73 MMHG | RESPIRATION RATE: 17 BRPM | HEIGHT: 66 IN | BODY MASS INDEX: 23.61 KG/M2 | HEART RATE: 72 BPM | OXYGEN SATURATION: 97 %

## 2022-09-19 DIAGNOSIS — E08.69 DIABETES DUE TO UNDERLYING CONDITION W OTH COMPLICATION: ICD-10-CM

## 2022-09-19 DIAGNOSIS — K75.4 AUTOIMMUNE HEPATITIS: Primary | ICD-10-CM

## 2022-09-19 PROCEDURE — 99999 PR PBB SHADOW E&M-EST. PATIENT-LVL IV: ICD-10-PCS | Mod: PBBFAC,,, | Performed by: INTERNAL MEDICINE

## 2022-09-19 PROCEDURE — 99999 PR PBB SHADOW E&M-EST. PATIENT-LVL IV: CPT | Mod: PBBFAC,,, | Performed by: INTERNAL MEDICINE

## 2022-09-19 PROCEDURE — 99214 OFFICE O/P EST MOD 30 MIN: CPT | Mod: S$GLB,,, | Performed by: INTERNAL MEDICINE

## 2022-09-19 PROCEDURE — 99214 PR OFFICE/OUTPT VISIT, EST, LEVL IV, 30-39 MIN: ICD-10-PCS | Mod: S$GLB,,, | Performed by: INTERNAL MEDICINE

## 2022-09-19 RX ORDER — PREDNISONE 5 MG/1
5 TABLET ORAL DAILY
Qty: 90 TABLET | Refills: 2 | OUTPATIENT
Start: 2022-09-19

## 2022-09-19 NOTE — PROGRESS NOTES
"Subjective:       Patient ID: William Hernandez is a 57 y.o. female.    Chief Complaint: Follow-up (3 month)    She has autoimmune hepatitis and was evaluated by the hepatologist.  Her glucose is 192.  Her father had pre diabetes and  the grandfather diabetes.  She is on chronic prednisone therapy for the autoimmune hepatitis.  She states that she has some of her medical records at home and will bring them to the office for review.  She is a "ERICKA baby ".  Her mother received the hormone therapy for the pregnancy.  She has been followed for the breast cancer by the oncologist.  She denies fever chills hematemesis hematochezia jaundice or bleeding.  Her liver test otherwise normal.  She states that she has gained approximately 30 lb since she moved here from [place].  She has not been as physically active but is going to try to be more physically active.  She has tried to ingest a nutritious diet with adequate fiber and vegetables.  She has been taking her vitamins and minerals including the calcium.  The hepatologist ordered a FibroScan and she does not want to return there but wants to have it scheduled locally.      Allergies:  Review of patient's allergies indicates:   Allergen Reactions    Amoxicillin Rash    Cefuroxime axetil Rash    Cephalexin      Other reaction(s): GI Intolerance  UPSET STOMACH  Other reaction(s): GI Intolerance  UPSET STOMACH         Medications:    Current Outpatient Medications:     calcium carbonate 1250 MG capsule, Take 1,250 mg by mouth., Disp: , Rfl:     cholecalciferol, vitamin D3, (VITAMIN D3) 25 mcg (1,000 unit) capsule, Take 2,000 Units by mouth., Disp: , Rfl:     loratadine 10 mg Cap, , Disp: , Rfl:     multivitamin capsule, Take 1 capsule by mouth once daily., Disp: , Rfl:     predniSONE (DELTASONE) 5 MG tablet, Take 1 tablet (5 mg total) by mouth once daily., Disp: 90 tablet, Rfl: 2    vit C/Zn gluc/herbal no.325 (ELDERBERRY ZINC VIT C MM), , Disp: , Rfl:     Past Medical " History:   Diagnosis Date    Allergy     Autoimmune hepatitis     Bulging lumbar disc     Cancer     Osteoporosis        Past Surgical History:   Procedure Laterality Date    APPENDECTOMY      masectomy      TONSILLECTOMY           Review of Systems   Constitutional:  Negative for appetite change, fever and unexpected weight change.   HENT:  Negative for trouble swallowing.         No jaundice.   Respiratory:  Negative for cough, shortness of breath and wheezing.         She denies tobacco usage.  She denies alcohol consumption.  She denies dysphagia aspiration hemoptysis chronic cough chronic sputum production or dyspnea on exertion.   Cardiovascular:  Negative for chest pain.        She denies exertional chest pain or rhythm disturbance.   Gastrointestinal:  Negative for abdominal distention, abdominal pain, anal bleeding, blood in stool, constipation, diarrhea, nausea, rectal pain and vomiting.        She denies significant pyrosis or dyspepsia.  She has daily bowel movements.  She has had a colonoscopy in the past which she believes was normal.  The records were requested.  At this point she is doing so well she does not want further GI evaluation.  She realizes she has gained weight.  She is trying to reduce her weight by decreasing her caloric intake and ingest more fiber and vegetables produced daily bowel movements.  She is trying to be more physically active.   Musculoskeletal:  Positive for back pain. Negative for neck pain.   Skin:  Negative for pallor and rash.   Neurological:  Negative for dizziness, seizures, syncope, speech difficulty, weakness and numbness.   Hematological:  Negative for adenopathy.   Psychiatric/Behavioral:  Negative for confusion.      Objective:      Physical Exam  Vitals reviewed.   Constitutional:       Appearance: She is well-developed.      Comments: Well-nourished well-hydrated afebrile nonicteric white female.  She is sitting comfortably in the chair.  She is  breathing normally.  She appears to be oriented x3 and can relate her history and answer questions appropriately.  She is normocephalic.  Pupils are normal.   HENT:      Head: Normocephalic.   Eyes:      Pupils: Pupils are equal, round, and reactive to light.   Neck:      Thyroid: No thyromegaly.      Trachea: No tracheal deviation.   Cardiovascular:      Rate and Rhythm: Normal rate and regular rhythm.      Heart sounds: Normal heart sounds.   Pulmonary:      Effort: Pulmonary effort is normal.      Breath sounds: Normal breath sounds.   Abdominal:      General: Bowel sounds are normal. There is no distension.      Palpations: Abdomen is soft. There is no mass.      Tenderness: There is no abdominal tenderness. There is no guarding or rebound.      Hernia: No hernia is present.      Comments: The abdomen is soft without tenderness masses or organomegaly.  Bowel sounds are normal.   Musculoskeletal:         General: Normal range of motion.      Cervical back: Normal range of motion and neck supple.      Comments: She can ambulate normally.  She can go from the sitting the standing position without difficulty.  She can get on the exam table without difficulty or assistance.   Lymphadenopathy:      Cervical: No cervical adenopathy.   Skin:     General: Skin is warm and dry.   Neurological:      Mental Status: She is alert and oriented to person, place, and time.      Cranial Nerves: No cranial nerve deficit.   Psychiatric:         Behavior: Behavior normal.         Plan:       Autoimmune hepatitis    Diabetes due to underlying condition w oth complication        She will be followed by the hepatologist.  She states she has her previous medical records at home and will bring them to the office for review.  She follows up with her other physicians include the oncologist.  She is on chronic prednisone therapy and her glucose is markedly elevated.  She is referred to the endocrinologist.  She will make a food diary and  avoid the offending foods.  She has tried to be more physically active to reduce her weight.  She continues her diet with adequate fiber and vegetables.  She will continue her current medications vitamins and minerals.

## 2022-09-19 NOTE — PATIENT INSTRUCTIONS
She will be referred to the endocrinologist for her chronic prednisone dosage and the glucose of 192.  She continues her current medications.  She follows up with her hepatologist for the autoimmune hepatitis.  She will try to start her exercise program a reduce her weight.  She continues her current medications vitamins and minerals.

## 2022-09-22 ENCOUNTER — TELEPHONE (OUTPATIENT)
Dept: HEPATOLOGY | Facility: CLINIC | Age: 57
End: 2022-09-22
Payer: COMMERCIAL

## 2022-09-26 ENCOUNTER — INFUSION (OUTPATIENT)
Dept: INFUSION THERAPY | Facility: HOSPITAL | Age: 57
End: 2022-09-26
Payer: COMMERCIAL

## 2022-09-26 VITALS — WEIGHT: 146 LBS | BODY MASS INDEX: 23.46 KG/M2 | HEIGHT: 66 IN

## 2022-09-26 DIAGNOSIS — M81.8 OSTEOPOROSIS DUE TO AROMATASE INHIBITOR: Primary | ICD-10-CM

## 2022-09-26 DIAGNOSIS — T38.6X5A OSTEOPOROSIS DUE TO AROMATASE INHIBITOR: Primary | ICD-10-CM

## 2022-09-26 PROCEDURE — 96372 THER/PROPH/DIAG INJ SC/IM: CPT

## 2022-09-26 PROCEDURE — 63600175 PHARM REV CODE 636 W HCPCS: Mod: JG | Performed by: FAMILY MEDICINE

## 2022-09-26 RX ADMIN — DENOSUMAB 60 MG: 60 INJECTION SUBCUTANEOUS at 09:09

## 2022-10-07 ENCOUNTER — TELEPHONE (OUTPATIENT)
Dept: LAB | Facility: CLINIC | Age: 57
End: 2022-10-07
Payer: COMMERCIAL

## 2022-10-07 NOTE — TELEPHONE ENCOUNTER
----- Message from Ania Shanks sent at 10/7/2022  9:21 AM CDT -----  Contact: [phone]  Type:  Same Day Appointment Request    Caller is requesting a same day appointment.  Caller declined first available appointment listed below.      Name of Caller:  Pt   When is the first available appointment?  Oct 25  Symptoms:  Needs to have her Blood sugar checked/ Pt experiencing high anxiety/ crying over the phone/ Both of her parents  and she feels like she needs help today  Best Call Back Number:  [phone]    Additional Information:   Pls call back and advise

## 2022-10-10 ENCOUNTER — TELEPHONE (OUTPATIENT)
Dept: GASTROENTEROLOGY | Facility: CLINIC | Age: 57
End: 2022-10-10
Payer: COMMERCIAL

## 2022-10-10 ENCOUNTER — OFFICE VISIT (OUTPATIENT)
Dept: FAMILY MEDICINE | Facility: CLINIC | Age: 57
End: 2022-10-10
Payer: COMMERCIAL

## 2022-10-10 VITALS
OXYGEN SATURATION: 98 % | SYSTOLIC BLOOD PRESSURE: 110 MMHG | WEIGHT: 143.63 LBS | HEIGHT: 66 IN | DIASTOLIC BLOOD PRESSURE: 60 MMHG | TEMPERATURE: 98 F | HEART RATE: 66 BPM | BODY MASS INDEX: 23.08 KG/M2

## 2022-10-10 DIAGNOSIS — F41.9 ANXIETY: ICD-10-CM

## 2022-10-10 DIAGNOSIS — R53.83 FATIGUE, UNSPECIFIED TYPE: ICD-10-CM

## 2022-10-10 DIAGNOSIS — G47.9 SLEEP DISORDER WITH MOOD COMPLAINTS: ICD-10-CM

## 2022-10-10 DIAGNOSIS — K75.4 AUTOIMMUNE HEPATITIS: Primary | ICD-10-CM

## 2022-10-10 DIAGNOSIS — R73.9 HYPERGLYCEMIA: ICD-10-CM

## 2022-10-10 DIAGNOSIS — D75.89 MACROCYTOSIS WITHOUT ANEMIA: ICD-10-CM

## 2022-10-10 DIAGNOSIS — Z76.89 ESTABLISHING CARE WITH NEW DOCTOR, ENCOUNTER FOR: ICD-10-CM

## 2022-10-10 DIAGNOSIS — Z79.52 CURRENT CHRONIC USE OF SYSTEMIC STEROIDS: ICD-10-CM

## 2022-10-10 DIAGNOSIS — F43.21 GRIEF: ICD-10-CM

## 2022-10-10 PROCEDURE — 99999 PR PBB SHADOW E&M-EST. PATIENT-LVL IV: CPT | Mod: PBBFAC,,, | Performed by: FAMILY MEDICINE

## 2022-10-10 PROCEDURE — 99215 OFFICE O/P EST HI 40 MIN: CPT | Mod: S$GLB,,, | Performed by: FAMILY MEDICINE

## 2022-10-10 PROCEDURE — 99999 PR PBB SHADOW E&M-EST. PATIENT-LVL IV: ICD-10-PCS | Mod: PBBFAC,,, | Performed by: FAMILY MEDICINE

## 2022-10-10 PROCEDURE — 99215 PR OFFICE/OUTPT VISIT, EST, LEVL V, 40-54 MIN: ICD-10-PCS | Mod: S$GLB,,, | Performed by: FAMILY MEDICINE

## 2022-10-10 RX ORDER — PREDNISONE 5 MG/1
5 TABLET ORAL DAILY
Qty: 90 TABLET | Refills: 1 | Status: SHIPPED | OUTPATIENT
Start: 2022-10-10 | End: 2024-02-27

## 2022-10-10 NOTE — Clinical Note
Please help schedule her Fibroscan test at [facility]. Confirm they do this procedure there (online Fibroscan lists that hospital as a facility with Fibroscan availability.) Pt does not want to have to drive all the way to [facility] for this and she shouldn't have to. Document as you find out information etc and keep pt updated. Thanks! (Ordered by hepatology/GI)

## 2022-10-10 NOTE — PROGRESS NOTES
Subjective:       Patient ID: William Hernandez is a 57 y.o. female.    Chief Complaint: Anxiety    New to me, previously seeing Dr. Cobos in [place]. States she was unaware that we even had an office here in [place].    Autoimmune hepatitis--has been seen by hepatology. States she was scheduled for a fibrous scan of her liver, but does not want to drive to [place] to have this done. Interested in getting the scan here on the [facility].    Anxiety--states she does not want to start an antidepressant. States she lost her mother 10 days ago.      Depression Patient Health Questionnaire 10/10/2022   Over the last two weeks how often have you been bothered by little interest or pleasure in doing things Not at all   Over the last two weeks how often have you been bothered by feeling down, depressed or hopeless Not at all   PHQ-2 Total Score 0     Review of Systems   All other systems reviewed and are negative.      Past Medical History:   Diagnosis Date    Allergy     Autoimmune hepatitis     Bulging lumbar disc     Cancer     Osteoporosis      Past Surgical History:   Procedure Laterality Date    APPENDECTOMY      BREAST SURGERY  2011    Bilateral mastecomy     SECTION  2001    masectomy      TONSILLECTOMY      TUBAL LIGATION  2012    Estimated date     Family History   Problem Relation Age of Onset    Irritable bowel syndrome Mother     Arthritis Mother     Depression Mother     Hearing loss Mother     Miscarriages / Stillbirths Mother     Heart disease Father     Stroke Father     Diabetes Paternal Grandmother        Review of patient's allergies indicates:   Allergen Reactions    Amoxicillin Rash    Cefuroxime axetil Rash    Cephalexin      Other reaction(s): GI Intolerance  UPSET STOMACH  Other reaction(s): GI Intolerance  UPSET STOMACH         Current Outpatient Medications:     calcium carbonate 1250 MG capsule, Take 1,250 mg by mouth., Disp: , Rfl:     cholecalciferol, vitamin D3,  "(VITAMIN D3) 25 mcg (1,000 unit) capsule, Take 2,000 Units by mouth., Disp: , Rfl:     loratadine 10 mg Cap, , Disp: , Rfl:     multivitamin capsule, Take 1 capsule by mouth once daily., Disp: , Rfl:     vit C/Zn gluc/herbal no.325 (ELDERBERRY ZINC VIT C MM), , Disp: , Rfl:     LORazepam (ATIVAN) 1 MG tablet, Take 0.5 tablets (0.5 mg total) by mouth every 12 (twelve) hours as needed for Anxiety (or restlessness). Can increase to a whole tablet if needed., Disp: 30 tablet, Rfl: 0    predniSONE (DELTASONE) 5 MG tablet, Take 1 tablet (5 mg total) by mouth once daily., Disp: 90 tablet, Rfl: 1    traZODone (DESYREL) 50 MG tablet, Take 1 tablet (50 mg total) by mouth nightly as needed for Insomnia., Disp: 90 tablet, Rfl: 1      Objective:      /60 (BP Location: Left arm, Patient Position: Sitting, BP Method: Medium (Manual))   Pulse 66   Temp 97.5 °F (36.4 °C) (Tympanic)   Ht 5' 6" (1.676 m)   Wt 65.2 kg (143 lb 10.1 oz)   SpO2 98%   BMI 23.18 kg/m²   Physical Exam  Vitals and nursing note reviewed.   Constitutional:       General: She is not in acute distress.     Appearance: Normal appearance. She is normal weight. She is not ill-appearing, toxic-appearing or diaphoretic.   HENT:      Head: Normocephalic and atraumatic.      Nose: Nose normal. No congestion.      Mouth/Throat:      Mouth: Mucous membranes are moist.   Eyes:      General: No scleral icterus.        Right eye: No discharge.         Left eye: No discharge.      Extraocular Movements: Extraocular movements intact.      Conjunctiva/sclera: Conjunctivae normal.      Pupils: Pupils are equal, round, and reactive to light.   Neck:      Vascular: No carotid bruit.   Cardiovascular:      Rate and Rhythm: Normal rate and regular rhythm.      Pulses: Normal pulses.      Heart sounds: Normal heart sounds. No murmur heard.  Pulmonary:      Effort: Pulmonary effort is normal.      Breath sounds: Normal breath sounds. No wheezing.   Abdominal:      General: " Abdomen is flat. There is no distension.   Musculoskeletal:      Cervical back: Normal range of motion and neck supple. No rigidity or tenderness.      Right lower leg: No edema.      Left lower leg: No edema.   Lymphadenopathy:      Cervical: No cervical adenopathy.   Skin:     General: Skin is warm and dry.      Capillary Refill: Capillary refill takes less than 2 seconds.      Coloration: Skin is not jaundiced.   Neurological:      General: No focal deficit present.      Mental Status: She is alert. Mental status is at baseline.      Motor: No weakness.      Coordination: Coordination normal.   Psychiatric:         Attention and Perception: Attention normal.         Mood and Affect: Mood is anxious. Affect is tearful.         Speech: Speech normal. Speech is not rapid and pressured.         Behavior: Behavior normal. Behavior is cooperative.         Thought Content: Thought content normal. Thought content is not paranoid or delusional. Thought content does not include homicidal or suicidal ideation. Thought content does not include homicidal or suicidal plan.         Cognition and Memory: Cognition and memory normal.         Judgment: Judgment normal.       Assessment:       1. Autoimmune hepatitis    2. Current chronic use of systemic steroids    3. Macrocytosis without anemia    4. Hyperglycemia    5. Anxiety    6. Grief    7. Sleep disorder with mood complaints    8. Fatigue, unspecified type    9. Establishing care with new doctor, encounter for          Plan:       Autoimmune hepatitis  -     predniSONE (DELTASONE) 5 MG tablet; Take 1 tablet (5 mg total) by mouth once daily.  Dispense: 90 tablet; Refill: 1    Current chronic use of systemic steroids  -     Magnesium; Future; Expected date: 10/10/2022  -     Vitamin D; Future; Expected date: 10/10/2022  -     Basic Metabolic Panel; Future; Expected date: 10/10/2022    Macrocytosis without anemia  -     Vitamin B12 Deficiency Panel; Future; Expected date:  10/10/2022  -     Folate; Future; Expected date: 10/10/2022    Hyperglycemia  -     Microalbumin/Creatinine Ratio, Urine; Future; Expected date: 10/10/2022  -     Hemoglobin A1C; Future; Expected date: 10/10/2022  -     Insulin, Random; Future; Expected date: 10/10/2022    Anxiety  -     Ambulatory referral/consult to Psychiatry; Future; Expected date: 10/17/2022  -     LORazepam (ATIVAN) 1 MG tablet; Take 0.5 tablets (0.5 mg total) by mouth every 12 (twelve) hours as needed for Anxiety (or restlessness). Can increase to a whole tablet if needed.  Dispense: 30 tablet; Refill: 0    Grief  -     Ambulatory referral/consult to Psychiatry; Future; Expected date: 10/17/2022    Sleep disorder with mood complaints  -     traZODone (DESYREL) 50 MG tablet; Take 1 tablet (50 mg total) by mouth nightly as needed for Insomnia.  Dispense: 90 tablet; Refill: 1    Fatigue, unspecified type  -     TSH; Future; Expected date: 10/10/2022  -     T4, Free; Future; Expected date: 10/10/2022    Establishing care with new doctor, encounter for      Needs to get the Fibroscan, will refer to local imaging facility. ([facility])  Labs as ordered above.  Keep follow up with hepatology.  Refer to psychiatry.  Trial of trazodone to help with sleep.   Short-term use of Ativan to help with severe anxiety prn.        Previous records in Epic were reviewed, including the last 3 months of encounters, imaging, laboratory, and pathology reports.    Strict return precautions reviewed and patient verbalized understanding. Risks, benefits, and alternatives to the plan were reviewed in detail and all questions answered to the patient's satisfaction. Patient agrees to return to clinic or ER if symptoms worsen. 60 minutes total were spent on today's visit, not limited to but including time based on counseling and coordination of care.    Patient instructed that best way to communicate with my office staff is for patient to get on the [facility]  epic patient portal to expedite communication and communication issues that may occur.  Patient was given instructions on how to get on the portal.  I encouraged patient to obtain portal access as well.  Ultimately it is up to the patient to obtain access.  Patient voiced understanding.    This note was created using [employer]form[employer] voice recognition software that occasionally may misinterpret phrases or words.    Follow up in about 2 weeks (around 10/24/2022) for follow up labs.

## 2022-10-10 NOTE — TELEPHONE ENCOUNTER
----- Message from Damien Najera MD sent at 9/30/2022  7:11 AM CDT -----  Contact: Self  Schedule test and obtain prednisone from the hepatologist.  Referred to the endocrinologist for diabetes.  ----- Message -----  From: Eloina Murphy LPN  Sent: 9/29/2022  11:31 AM CDT  To: Damien Najera MD    Please advise.  ----- Message -----  From: Jitendra Hunt  Sent: 9/29/2022  10:19 AM CDT  To: [facility] Quezada Staff    Type: Needs Medical Advice  Who Called:  Patient  Best Call Back Number: [phone]   Additional Information:  Called to speak with office regarding 9/19 appt. States there should be prednisone prescription called in, also referrals for Endo and Fiber scan

## 2022-10-13 ENCOUNTER — TELEPHONE (OUTPATIENT)
Dept: FAMILY MEDICINE | Facility: CLINIC | Age: 57
End: 2022-10-13
Payer: COMMERCIAL

## 2022-10-13 NOTE — TELEPHONE ENCOUNTER
Spoke with patient she was in on Monday for an appointment. She stated that Lorazepam and Trazadone were suppose to be sent to the pharmacy. She wasn't sure of the dose as it was discussed at there appointment. Please Advise

## 2022-10-13 NOTE — TELEPHONE ENCOUNTER
----- Message from Denzel Jimenez sent at 10/13/2022 12:30 PM CDT -----  Contact: pt  Type: Needs Medical Advice  Who Called:  pt   Best Call Back Number: [phone]  Additional Information: pt states her pharmacy never received her prescriptions and they are not her chart. Please advise.

## 2022-10-14 ENCOUNTER — LAB VISIT (OUTPATIENT)
Dept: FAMILY MEDICINE | Facility: CLINIC | Age: 57
End: 2022-10-14
Payer: COMMERCIAL

## 2022-10-14 DIAGNOSIS — R73.9 HYPERGLYCEMIA: ICD-10-CM

## 2022-10-14 DIAGNOSIS — R53.83 FATIGUE, UNSPECIFIED TYPE: ICD-10-CM

## 2022-10-14 DIAGNOSIS — D75.89 MACROCYTOSIS WITHOUT ANEMIA: ICD-10-CM

## 2022-10-14 DIAGNOSIS — Z79.52 CURRENT CHRONIC USE OF SYSTEMIC STEROIDS: ICD-10-CM

## 2022-10-14 LAB
ANION GAP SERPL CALC-SCNC: 10 MMOL/L (ref 8–16)
BUN SERPL-MCNC: 14 MG/DL (ref 6–20)
CALCIUM SERPL-MCNC: 8.8 MG/DL (ref 8.7–10.5)
CHLORIDE SERPL-SCNC: 105 MMOL/L (ref 95–110)
CO2 SERPL-SCNC: 26 MMOL/L (ref 23–29)
CREAT SERPL-MCNC: 0.8 MG/DL (ref 0.5–1.4)
EST. GFR  (NO RACE VARIABLE): >60 ML/MIN/1.73 M^2
ESTIMATED AVG GLUCOSE: 111 MG/DL (ref 68–131)
GLUCOSE SERPL-MCNC: 86 MG/DL (ref 70–110)
HBA1C MFR BLD: 5.5 % (ref 4–5.6)
INSULIN COLLECTION INTERVAL: NORMAL
INSULIN SERPL-ACNC: 6 UU/ML
MAGNESIUM SERPL-MCNC: 2.2 MG/DL (ref 1.6–2.6)
POTASSIUM SERPL-SCNC: 4 MMOL/L (ref 3.5–5.1)
SODIUM SERPL-SCNC: 141 MMOL/L (ref 136–145)
T4 FREE SERPL-MCNC: 1.12 NG/DL (ref 0.71–1.51)
TSH SERPL DL<=0.005 MIU/L-ACNC: 0.97 UIU/ML (ref 0.4–4)

## 2022-10-14 PROCEDURE — 80048 BASIC METABOLIC PNL TOTAL CA: CPT | Performed by: FAMILY MEDICINE

## 2022-10-14 PROCEDURE — 83036 HEMOGLOBIN GLYCOSYLATED A1C: CPT | Performed by: FAMILY MEDICINE

## 2022-10-14 PROCEDURE — 83921 ORGANIC ACID SINGLE QUANT: CPT | Performed by: FAMILY MEDICINE

## 2022-10-14 PROCEDURE — 82306 VITAMIN D 25 HYDROXY: CPT | Performed by: FAMILY MEDICINE

## 2022-10-14 PROCEDURE — 83525 ASSAY OF INSULIN: CPT | Performed by: FAMILY MEDICINE

## 2022-10-14 PROCEDURE — 82607 VITAMIN B-12: CPT | Performed by: FAMILY MEDICINE

## 2022-10-14 PROCEDURE — 84439 ASSAY OF FREE THYROXINE: CPT | Performed by: FAMILY MEDICINE

## 2022-10-14 PROCEDURE — 83735 ASSAY OF MAGNESIUM: CPT | Performed by: FAMILY MEDICINE

## 2022-10-14 PROCEDURE — 82746 ASSAY OF FOLIC ACID SERUM: CPT | Performed by: FAMILY MEDICINE

## 2022-10-14 PROCEDURE — 84443 ASSAY THYROID STIM HORMONE: CPT | Performed by: FAMILY MEDICINE

## 2022-10-14 RX ORDER — LORAZEPAM 1 MG/1
0.5 TABLET ORAL EVERY 12 HOURS PRN
Qty: 30 TABLET | Refills: 0 | Status: SHIPPED | OUTPATIENT
Start: 2022-10-14 | End: 2023-11-21 | Stop reason: SDUPTHER

## 2022-10-14 RX ORDER — TRAZODONE HYDROCHLORIDE 50 MG/1
50 TABLET ORAL NIGHTLY PRN
Qty: 90 TABLET | Refills: 1 | Status: SHIPPED | OUTPATIENT
Start: 2022-10-14 | End: 2023-01-31

## 2022-10-14 NOTE — PROGRESS NOTES
Venipuncture performed with 21 gauge butterfly, x's 1 attempt, to L Antecubital vein.     Specimens collected per orders.     Pressure dressing applied to site, instructed patient to remove dressing in 10-15 minutes, OK to re-adjust dressing if pressure causing any discomfort, to observe closely for numbness and/or discoloration to hand or fingers, and to notify provider if bleeding persists after applying constant pressure lasting 30 minutes.

## 2022-10-14 NOTE — TELEPHONE ENCOUNTER
Rxs have been sent in now electronically. I did not realize they did not get sent in on Monday. Patient notified.

## 2022-10-15 LAB
25(OH)D3+25(OH)D2 SERPL-MCNC: 71 NG/ML (ref 30–96)
FOLATE SERPL-MCNC: 16.4 NG/ML (ref 4–24)

## 2022-10-17 LAB — VIT B12 SERPL-MCNC: 377 NG/L (ref 180–914)

## 2022-10-19 LAB — METHYLMALONATE SERPL-SCNC: 0.11 NMOL/ML

## 2022-10-23 PROBLEM — F43.21 GRIEF: Status: ACTIVE | Noted: 2022-10-23

## 2022-10-23 PROBLEM — F41.9 ANXIETY: Status: ACTIVE | Noted: 2022-10-23

## 2022-10-23 PROBLEM — D75.89 MACROCYTOSIS WITHOUT ANEMIA: Status: ACTIVE | Noted: 2022-10-23

## 2022-10-23 PROBLEM — R73.9 HYPERGLYCEMIA: Status: ACTIVE | Noted: 2022-10-23

## 2022-10-23 PROBLEM — R53.83 FATIGUE: Status: ACTIVE | Noted: 2022-10-23

## 2022-10-24 ENCOUNTER — PATIENT MESSAGE (OUTPATIENT)
Dept: FAMILY MEDICINE | Facility: CLINIC | Age: 57
End: 2022-10-24

## 2022-10-24 ENCOUNTER — TELEPHONE (OUTPATIENT)
Dept: FAMILY MEDICINE | Facility: CLINIC | Age: 57
End: 2022-10-24
Payer: COMMERCIAL

## 2022-10-24 ENCOUNTER — OFFICE VISIT (OUTPATIENT)
Dept: FAMILY MEDICINE | Facility: CLINIC | Age: 57
End: 2022-10-24
Payer: COMMERCIAL

## 2022-10-24 VITALS
HEIGHT: 66 IN | WEIGHT: 146.63 LBS | DIASTOLIC BLOOD PRESSURE: 70 MMHG | OXYGEN SATURATION: 96 % | HEART RATE: 68 BPM | SYSTOLIC BLOOD PRESSURE: 118 MMHG | BODY MASS INDEX: 23.57 KG/M2 | TEMPERATURE: 99 F

## 2022-10-24 DIAGNOSIS — E53.8 LOW SERUM VITAMIN B12: Primary | ICD-10-CM

## 2022-10-24 DIAGNOSIS — N89.8 VAGINAL DRYNESS: ICD-10-CM

## 2022-10-24 DIAGNOSIS — N94.19 DYSPAREUNIA DUE TO MEDICAL CONDITION IN FEMALE: ICD-10-CM

## 2022-10-24 DIAGNOSIS — R73.9 HYPERGLYCEMIA: ICD-10-CM

## 2022-10-24 DIAGNOSIS — K75.4 AUTOIMMUNE HEPATITIS: ICD-10-CM

## 2022-10-24 DIAGNOSIS — Z85.3 HISTORY OF BREAST CANCER: ICD-10-CM

## 2022-10-24 PROCEDURE — 96372 PR INJECTION,THERAP/PROPH/DIAG2ST, IM OR SUBCUT: ICD-10-PCS | Mod: S$GLB,,, | Performed by: FAMILY MEDICINE

## 2022-10-24 PROCEDURE — 99214 OFFICE O/P EST MOD 30 MIN: CPT | Mod: 25,S$GLB,, | Performed by: FAMILY MEDICINE

## 2022-10-24 PROCEDURE — 99999 PR PBB SHADOW E&M-EST. PATIENT-LVL V: ICD-10-PCS | Mod: PBBFAC,,, | Performed by: FAMILY MEDICINE

## 2022-10-24 PROCEDURE — 96372 THER/PROPH/DIAG INJ SC/IM: CPT | Mod: S$GLB,,, | Performed by: FAMILY MEDICINE

## 2022-10-24 PROCEDURE — 99999 PR PBB SHADOW E&M-EST. PATIENT-LVL V: CPT | Mod: PBBFAC,,, | Performed by: FAMILY MEDICINE

## 2022-10-24 PROCEDURE — 99214 PR OFFICE/OUTPT VISIT, EST, LEVL IV, 30-39 MIN: ICD-10-PCS | Mod: 25,S$GLB,, | Performed by: FAMILY MEDICINE

## 2022-10-24 RX ORDER — CYANOCOBALAMIN 1000 UG/ML
1000 INJECTION, SOLUTION INTRAMUSCULAR; SUBCUTANEOUS ONCE
Status: COMPLETED | OUTPATIENT
Start: 2022-10-24 | End: 2022-10-24

## 2022-10-24 RX ADMIN — CYANOCOBALAMIN 1000 MCG: 1000 INJECTION, SOLUTION INTRAMUSCULAR; SUBCUTANEOUS at 12:10

## 2022-10-24 NOTE — TELEPHONE ENCOUNTER
----- Message from Ada Arroyo MD sent at 10/23/2022  4:39 PM CDT -----  Please help schedule her Fibroscan test at [facility]. Confirm they do this procedure there (online Fibroscan lists that hospital as a facility with Fibroscan availability.) Pt does not want to have to drive all the way to [facility] for this and she shouldn't have to. Document as you find out information etc and keep pt updated. Thanks! (Ordered by hepatology/GI)   Inadequate Oral Intake

## 2022-10-24 NOTE — PROGRESS NOTES
CBC:   Lab Results   Component Value Date    WBC 6.58 09/15/2022    HGB 13.7 09/15/2022    HCT 42.2 09/15/2022     09/15/2022    MCV 96 09/15/2022    RDW 13.2 09/15/2022     CMP:   Lab Results   Component Value Date     10/14/2022    K 4.0 10/14/2022     10/14/2022    CO2 26 10/14/2022    BUN 14 10/14/2022    CREATININE 0.8 10/14/2022    GLU 86 10/14/2022    CALCIUM 8.8 10/14/2022    MG 2.2 10/14/2022    ALKPHOS 57 09/15/2022    PROT 6.3 09/15/2022    ALBUMIN 3.7 09/15/2022    BILITOT 0.4 09/15/2022    AST 26 09/15/2022    ALT 30 09/15/2022     LIPIDS:   Lab Results   Component Value Date    CHOL 181 2022    HDL 59 2022    LDLCALC 104.4 2022    TRIG 88 2022    CHOLHDL 32.6 2022     HA1C:   Lab Results   Component Value Date    HGBA1C 5.5 10/14/2022     TSH:   Lab Results   Component Value Date    TSH 0.970 10/14/2022       Subjective:       Patient ID: William Hernandez is a 57 y.o. female.    Chief Complaint: Follow-up    Here today for review of recent labs.    Labs above and those resulted in Epic were reviewed in detail with patient and all questions answered to his/her satisfaction.        Depression Patient Health Questionnaire 10/10/2022   Over the last two weeks how often have you been bothered by little interest or pleasure in doing things Not at all   Over the last two weeks how often have you been bothered by feeling down, depressed or hopeless Not at all   PHQ-2 Total Score 0       Review of Systems   All other systems reviewed and are negative.      Past Medical History:   Diagnosis Date    Allergy     Autoimmune hepatitis     Bulging lumbar disc     Cancer     Osteoporosis      Past Surgical History:   Procedure Laterality Date    APPENDECTOMY      BREAST SURGERY  2011    Bilateral mastecomy     SECTION  2001    masectomy      TONSILLECTOMY      TUBAL LIGATION  2012    Estimated date     Family History   Problem Relation Age of  "Onset    Irritable bowel syndrome Mother     Arthritis Mother     Depression Mother     Hearing loss Mother     Miscarriages / Stillbirths Mother     Heart disease Father     Stroke Father     Diabetes Paternal Grandmother        Review of patient's allergies indicates:   Allergen Reactions    Amoxicillin Rash    Cefuroxime axetil Rash    Cephalexin      Other reaction(s): GI Intolerance  UPSET STOMACH  Other reaction(s): GI Intolerance  UPSET STOMACH         Current Outpatient Medications:     calcium carbonate 1250 MG capsule, Take 1,250 mg by mouth., Disp: , Rfl:     cholecalciferol, vitamin D3, (VITAMIN D3) 25 mcg (1,000 unit) capsule, Take 2,000 Units by mouth., Disp: , Rfl:     loratadine 10 mg Cap, , Disp: , Rfl:     LORazepam (ATIVAN) 1 MG tablet, Take 0.5 tablets (0.5 mg total) by mouth every 12 (twelve) hours as needed for Anxiety (or restlessness). Can increase to a whole tablet if needed., Disp: 30 tablet, Rfl: 0    multivitamin capsule, Take 1 capsule by mouth once daily., Disp: , Rfl:     predniSONE (DELTASONE) 5 MG tablet, Take 1 tablet (5 mg total) by mouth once daily., Disp: 90 tablet, Rfl: 1    traZODone (DESYREL) 50 MG tablet, Take 1 tablet (50 mg total) by mouth nightly as needed for Insomnia., Disp: 90 tablet, Rfl: 1    vit C/Zn gluc/herbal no.325 (ELDERBERRY ZINC VIT C MM), , Disp: , Rfl:     estradioL (ESTRACE) 0.01 % (0.1 mg/gram) vaginal cream, Place 1 g vaginally once daily., Disp: 42.5 g, Rfl: 2      Objective:      /70 (BP Location: Left arm, Patient Position: Sitting, BP Method: Medium (Automatic))   Pulse 68   Temp 98.5 °F (36.9 °C) (Tympanic)   Ht 5' 6" (1.676 m)   Wt 66.5 kg (146 lb 9.7 oz)   SpO2 96%   BMI 23.66 kg/m²   Physical Exam  Vitals and nursing note reviewed.   Constitutional:       General: She is not in acute distress.     Appearance: Normal appearance. She is normal weight. She is not ill-appearing, toxic-appearing or diaphoretic.   HENT:      Head: " Normocephalic and atraumatic.      Nose: Nose normal. No congestion.      Mouth/Throat:      Mouth: Mucous membranes are moist.   Eyes:      General: No scleral icterus.        Right eye: No discharge.         Left eye: No discharge.      Conjunctiva/sclera: Conjunctivae normal.   Cardiovascular:      Rate and Rhythm: Normal rate and regular rhythm.      Pulses: Normal pulses.      Heart sounds: Normal heart sounds. No murmur heard.  Pulmonary:      Effort: Pulmonary effort is normal.      Breath sounds: Normal breath sounds. No wheezing.   Abdominal:      General: Abdomen is flat. There is no distension.   Musculoskeletal:      Cervical back: Neck supple.      Right lower leg: No edema.      Left lower leg: No edema.   Skin:     General: Skin is warm and dry.      Capillary Refill: Capillary refill takes less than 2 seconds.      Coloration: Skin is not jaundiced.   Neurological:      General: No focal deficit present.      Mental Status: She is alert. Mental status is at baseline.      Motor: No weakness.      Coordination: Coordination normal.   Psychiatric:         Attention and Perception: Attention normal.         Mood and Affect: Mood is anxious. Affect is tearful.         Speech: Speech normal. Speech is not rapid and pressured.         Behavior: Behavior normal. Behavior is cooperative.         Thought Content: Thought content normal. Thought content is not paranoid or delusional. Thought content does not include homicidal or suicidal ideation. Thought content does not include homicidal or suicidal plan.         Cognition and Memory: Cognition and memory normal.         Judgment: Judgment normal.       Assessment:       1. Low serum vitamin B12    2. Autoimmune hepatitis    3. History of breast cancer    4. Vaginal dryness    5. Dyspareunia due to medical condition in female    6. Hyperglycemia        Plan:       Low serum vitamin B12  -     cyanocobalamin injection 1,000 mcg    Autoimmune hepatitis  -      Ambulatory referral/consult to Gastroenterology; Future; Expected date: 10/31/2022    History of breast cancer  -     Ambulatory referral/consult to Hematology / Oncology; Future; Expected date: 10/31/2022  -     Ambulatory referral/consult to Obstetrics / Gynecology; Future; Expected date: 10/31/2022    Vaginal dryness  -     Ambulatory referral/consult to Obstetrics / Gynecology; Future; Expected date: 10/31/2022    Dyspareunia due to medical condition in female  -     Ambulatory referral/consult to Obstetrics / Gynecology; Future; Expected date: 10/31/2022    Hyperglycemia  -     Hemoglobin A1C; Future; Expected date: 01/24/2023      Okay to familiarize herself with home glucometer.   A1C was 5.5, will recheck in 3-4 months. Order placed above.    Encouraged regular sleep pattern.    Referrals as above.          Previous records in Epic were reviewed, including the last 3 months of encounters, imaging, laboratory, and pathology reports.    Strict return precautions reviewed and patient verbalized understanding. Risks, benefits, and alternatives to the plan were reviewed in detail and all questions answered to the patient's satisfaction. Patient agrees to return to clinic or ER if symptoms worsen. 30 minutes total were spent on today's visit, not limited to but including time based on counseling and coordination of care.    Patient instructed that best way to communicate with my office staff is for patient to get on the [facility] epic patient portal to expedite communication and communication issues that may occur.  Patient was given instructions on how to get on the portal.  I encouraged patient to obtain portal access as well.  Ultimately it is up to the patient to obtain access.  Patient voiced understanding.    This note was created using [employer] voice recognition software that occasionally may misinterpret phrases or words.    Follow up for 12-14 weeks follow up A1C, low B12, fatigue.

## 2022-10-24 NOTE — TELEPHONE ENCOUNTER
Spoke with  at [facility]. Order  electronically faxed. ([phone]) Patient to call and get scheduled([phone])The only place with [facility] is the one in [place].

## 2022-10-25 ENCOUNTER — PATIENT MESSAGE (OUTPATIENT)
Dept: PRIMARY CARE CLINIC | Facility: CLINIC | Age: 57
End: 2022-10-25
Payer: COMMERCIAL

## 2022-10-26 ENCOUNTER — TELEPHONE (OUTPATIENT)
Dept: FAMILY MEDICINE | Facility: CLINIC | Age: 57
End: 2022-10-26
Payer: COMMERCIAL

## 2022-10-26 NOTE — TELEPHONE ENCOUNTER
Call placed to Ivania/Oncology state a referral was sent on patient but patient currently sees Dr. France and LOV 3/2022.     ----- Message from Rosalva Carson sent at 10/26/2022 11:11 AM CDT -----  Type: Needs Medical Advice  Who Called:  Ivania   Mack Call Back Number: [phone]  Additional Information: she is calling in reference to a referral that was sent to her office for the pt

## 2022-10-28 ENCOUNTER — TELEPHONE (OUTPATIENT)
Dept: FAMILY MEDICINE | Facility: CLINIC | Age: 57
End: 2022-10-28
Payer: COMMERCIAL

## 2022-10-28 NOTE — TELEPHONE ENCOUNTER
----- Message from Eda Putnam sent at 10/28/2022 11:21 AM CDT -----  Regarding: concerns  Name of Who is Calling: Dr. Michaela Redd office           What is the request in detail: Ivania is requesting a call back to discuss the referral that was sent over.            Can the clinic reply by [facility]: No           What Number to Call Back if not in [facility]: [phone]

## 2022-10-28 NOTE — TELEPHONE ENCOUNTER
----- Message from Eda Putnam sent at 10/28/2022 11:21 AM CDT -----  Regarding: concerns  Name of Who is Calling: Dr. Michaela Redd office           What is the request in detail: [facility] is requesting a call back to discuss the referral that was sent over.            Can the clinic reply by [facility]: No           What Number to Call Back if not in [facility]: [phone]

## 2022-10-28 NOTE — TELEPHONE ENCOUNTER
Spoke with Ivania and advised her to contact medical records for the path report from 2011 that I was not able to find it. Verbalized understanding.

## 2022-10-31 ENCOUNTER — OFFICE VISIT (OUTPATIENT)
Dept: OBSTETRICS AND GYNECOLOGY | Facility: CLINIC | Age: 57
End: 2022-10-31
Payer: COMMERCIAL

## 2022-10-31 VITALS
DIASTOLIC BLOOD PRESSURE: 72 MMHG | HEIGHT: 66 IN | BODY MASS INDEX: 23.53 KG/M2 | SYSTOLIC BLOOD PRESSURE: 114 MMHG | WEIGHT: 146.38 LBS

## 2022-10-31 DIAGNOSIS — Z85.3 HISTORY OF BREAST CANCER: ICD-10-CM

## 2022-10-31 DIAGNOSIS — N94.19 DYSPAREUNIA DUE TO MEDICAL CONDITION IN FEMALE: ICD-10-CM

## 2022-10-31 DIAGNOSIS — N95.2 VAGINAL ATROPHY: Primary | ICD-10-CM

## 2022-10-31 DIAGNOSIS — N89.8 VAGINAL DRYNESS: ICD-10-CM

## 2022-10-31 PROCEDURE — 99204 PR OFFICE/OUTPT VISIT, NEW, LEVL IV, 45-59 MIN: ICD-10-PCS | Mod: S$GLB,,, | Performed by: OBSTETRICS & GYNECOLOGY

## 2022-10-31 PROCEDURE — 99204 OFFICE O/P NEW MOD 45 MIN: CPT | Mod: S$GLB,,, | Performed by: OBSTETRICS & GYNECOLOGY

## 2022-10-31 RX ORDER — ESTRADIOL 0.1 MG/G
1 CREAM VAGINAL DAILY
Qty: 42.5 G | Refills: 2 | Status: SHIPPED | OUTPATIENT
Start: 2022-10-31 | End: 2023-02-06 | Stop reason: SDUPTHER

## 2022-10-31 NOTE — PROGRESS NOTES
Subjective:       Patient ID: William Hernandez is a 57 y.o. female.    Chief Complaint: Establish Care and Vaginal Atrophy  Pt havign sever issues with vag atrophy and irritation and dryness, pt had a dx of BRCA about 12 yrs ago and completed 10 yrs of Arimedex-has tried vag ERT minimaaly and has used dilators with limited success, pt has gotten pretty comfortable with a size 3 dilator and has not been able to graduate to the next size. Used ERT on vulva only and ran out  Referred here to disc   No adequate Pap smear in many yrs  Disc h/o autoimmune hepatitis and has been maintained with oral Prednisone 5 mg daily  HPI  Review of Systems      Objective:      Physical Exam  Vitals and nursing note reviewed.   Constitutional:       Appearance: Normal appearance.   HENT:      Head: Normocephalic.   Pulmonary:      Effort: Pulmonary effort is normal.   Musculoskeletal:      Cervical back: Normal range of motion.   Neurological:      Mental Status: She is alert.   Psychiatric:         Attention and Perception: Attention normal.         Mood and Affect: Mood normal. Affect is tearful.         Speech: Speech normal.         Behavior: Behavior normal.       Assessment:       Problem List Items Addressed This Visit          Renal/    Vaginal dryness    Dyspareunia due to medical condition in female       Oncology    History of breast cancer     Other Visit Diagnoses       Vaginal atrophy    -  Primary              Plan:   Vaginal atrophy    History of breast cancer  -     Ambulatory referral/consult to Obstetrics / Gynecology    Vaginal dryness  -     Ambulatory referral/consult to Obstetrics / Gynecology    Dyspareunia due to medical condition in female  -     Ambulatory referral/consult to Obstetrics / Gynecology    Other orders  -     estradioL (ESTRACE) 0.01 % (0.1 mg/gram) vaginal cream; Place 1 g vaginally once daily.  Dispense: 42.5 g; Refill: 2    Disc marital issues and vaginal ERT and pelvic floor PT  Pt tearful b/c  of trouble with her marriage  Disc diff dilators and rec using vag ERT daily for a minimum of 2 weeks before decreasing to QOD and possibly 3x a week  RTC 3 months for annual-will attempt Pap

## 2022-12-06 ENCOUNTER — PATIENT MESSAGE (OUTPATIENT)
Dept: FAMILY MEDICINE | Facility: CLINIC | Age: 57
End: 2022-12-06
Payer: COMMERCIAL

## 2022-12-13 ENCOUNTER — OFFICE VISIT (OUTPATIENT)
Dept: FAMILY MEDICINE | Facility: CLINIC | Age: 57
End: 2022-12-13
Payer: COMMERCIAL

## 2022-12-13 DIAGNOSIS — F43.21 GRIEF: ICD-10-CM

## 2022-12-13 DIAGNOSIS — F41.9 ANXIETY: Primary | ICD-10-CM

## 2022-12-13 PROCEDURE — 99214 PR OFFICE/OUTPT VISIT, EST, LEVL IV, 30-39 MIN: ICD-10-PCS | Mod: 95,,, | Performed by: FAMILY MEDICINE

## 2022-12-13 PROCEDURE — 99214 OFFICE O/P EST MOD 30 MIN: CPT | Mod: 95,,, | Performed by: FAMILY MEDICINE

## 2022-12-13 NOTE — PROGRESS NOTES
The patient location is: [place]  The chief complaint leading to consultation is: anxiety    Visit type: audiovisual converted to audio after battery    Face to Face time with patient: 20 min  30 minutes of total time spent on the encounter, which includes face to face time and non-face to face time preparing to see the patient (eg, review of tests), Obtaining and/or reviewing separately obtained history, Documenting clinical information in the electronic or other health record, Independently interpreting results (not separately reported) and communicating results to the patient/family/caregiver, or Care coordination (not separately reported).         Each patient to whom he or she provides medical services by telemedicine is:  (1) informed of the relationship between the physician and patient and the respective role of any other health care provider with respect to management of the patient; and (2) notified that he or she may decline to receive medical services by telemedicine and may withdraw from such care at any time.    Notes:     Subjective:       Patient ID: William Hernandez is a 57 y.o. female.    Chief Complaint: Anxiety    Patient here via VV to discuss anxiety. She has increased stress with work, reports working longer hours and this is taking a toll on her personal and family life. States she has been avoiding personal life due to focusing on work. She has been so busy with work that she has avoided exercises (stress reliever.)     Patient is interested in taking some time off work to get in control of her stressors and manage her anxiety. She reports having upcoming scheduled time off from work, but is very worried about holiday travels. Patient is going through the grieving process still, and she has many things to discuss.     Scheduled to be off some next month but would benefit from taking off a few weeks in a row after wrapping up some thing at work.    Depression Patient Health Questionnaire 10/10/2022    Over the last two weeks how often have you been bothered by little interest or pleasure in doing things Not at all   Over the last two weeks how often have you been bothered by feeling down, depressed or hopeless Not at all   PHQ-2 Total Score 0     No flowsheet data found.    Review of Systems   Constitutional:  Positive for activity change and unexpected weight change.   HENT:  Negative for hearing loss, rhinorrhea and trouble swallowing.    Eyes:  Negative for discharge and visual disturbance.   Respiratory:  Negative for chest tightness and wheezing.    Cardiovascular:  Positive for palpitations. Negative for chest pain.   Gastrointestinal:  Negative for blood in stool, constipation, diarrhea and vomiting.   Endocrine: Negative for polydipsia and polyuria.   Genitourinary:  Negative for difficulty urinating, dysuria, hematuria and menstrual problem.   Musculoskeletal:  Negative for arthralgias, joint swelling and neck pain.   Neurological:  Positive for headaches. Negative for weakness.   Psychiatric/Behavioral:  Negative for confusion and dysphoric mood.    All other systems reviewed and are negative.      Past Medical History:   Diagnosis Date    Allergy     Autoimmune hepatitis     Bulging lumbar disc     Cancer     Osteoporosis      Past Surgical History:   Procedure Laterality Date    APPENDECTOMY      BREAST SURGERY  2011    Bilateral mastecomy     SECTION  2001    masectomy      TONSILLECTOMY      TUBAL LIGATION  2012    Estimated date     Family History   Problem Relation Age of Onset    Irritable bowel syndrome Mother     Arthritis Mother     Depression Mother     Hearing loss Mother     Miscarriages / Stillbirths Mother     Heart disease Father     Stroke Father     Diabetes Paternal Grandmother        Review of patient's allergies indicates:   Allergen Reactions    Amoxicillin Rash    Cefuroxime axetil Rash    Cephalexin      Other reaction(s): GI Intolerance  UPSET  STOMACH  Other reaction(s): GI Intolerance  UPSET STOMACH         Current Outpatient Medications:     calcium carbonate 1250 MG capsule, Take 1,250 mg by mouth., Disp: , Rfl:     cholecalciferol, vitamin D3, (VITAMIN D3) 25 mcg (1,000 unit) capsule, Take 2,000 Units by mouth., Disp: , Rfl:     estradioL (ESTRACE) 0.01 % (0.1 mg/gram) vaginal cream, Place 1 g vaginally once daily., Disp: 42.5 g, Rfl: 2    loratadine 10 mg Cap, , Disp: , Rfl:     LORazepam (ATIVAN) 1 MG tablet, Take 0.5 tablets (0.5 mg total) by mouth every 12 (twelve) hours as needed for Anxiety (or restlessness). Can increase to a whole tablet if needed., Disp: 30 tablet, Rfl: 0    multivitamin capsule, Take 1 capsule by mouth once daily., Disp: , Rfl:     predniSONE (DELTASONE) 5 MG tablet, Take 1 tablet (5 mg total) by mouth once daily., Disp: 90 tablet, Rfl: 1    traZODone (DESYREL) 50 MG tablet, Take 1 tablet (50 mg total) by mouth nightly as needed for Insomnia., Disp: 90 tablet, Rfl: 1    vit C/Zn gluc/herbal no.325 (ELDERBERRY ZINC VIT C MM), , Disp: , Rfl:       Objective:      There were no vitals taken for this visit.  Physical Exam  Constitutional:       General: She is not in acute distress.  Pulmonary:      Effort: Pulmonary effort is normal. No respiratory distress.   Neurological:      Mental Status: She is alert and oriented to person, place, and time.   Psychiatric:         Attention and Perception: Attention normal.         Mood and Affect: Affect normal. Mood is anxious.         Behavior: Behavior normal.         Thought Content: Thought content normal.         Judgment: Judgment normal.       Assessment:       1. Anxiety    2. Grief        Plan:       Anxiety    Grief            Recommend she take 3 weeks off work, starting Dec 19 and returning to work on January 9. We will set up a follow up visit the week before to re-evaluate and be sure she is ready to return to work.      Previous records in Epic were reviewed, including the  last 3 months of encounters, imaging, laboratory, and pathology reports.    Strict return precautions reviewed and patient verbalized understanding. Risks, benefits, and alternatives to the plan were reviewed in detail and all questions answered to the patient's satisfaction. Patient agrees to return to clinic or ER if symptoms worsen. 30 minutes total were spent on today's visit, not limited to but including time based on counseling and coordination of care.    Patient instructed that best way to communicate with my office staff is for patient to get on the [facility] epic patient portal to expedite communication and communication issues that may occur.  Patient was given instructions on how to get on the portal.  I encouraged patient to obtain portal access as well.  Ultimately it is up to the patient to obtain access.  Patient voiced understanding.    This note was created using [employer] voice recognition software that occasionally may misinterpret phrases or words.    Follow up in about 24 days (around 1/6/2023) for Virtual Visit or in person for follow up/RTW.

## 2022-12-19 ENCOUNTER — PATIENT MESSAGE (OUTPATIENT)
Dept: FAMILY MEDICINE | Facility: CLINIC | Age: 57
End: 2022-12-19
Payer: COMMERCIAL

## 2023-01-03 ENCOUNTER — PATIENT MESSAGE (OUTPATIENT)
Dept: FAMILY MEDICINE | Facility: CLINIC | Age: 58
End: 2023-01-03
Payer: COMMERCIAL

## 2023-01-10 ENCOUNTER — OFFICE VISIT (OUTPATIENT)
Dept: FAMILY MEDICINE | Facility: CLINIC | Age: 58
End: 2023-01-10
Payer: COMMERCIAL

## 2023-01-10 DIAGNOSIS — F41.8 SITUATIONAL ANXIETY: Primary | ICD-10-CM

## 2023-01-10 DIAGNOSIS — F41.9 ANXIETY: ICD-10-CM

## 2023-01-10 DIAGNOSIS — Z56.6 STRESS AT WORK: ICD-10-CM

## 2023-01-10 PROCEDURE — 99213 PR OFFICE/OUTPT VISIT, EST, LEVL III, 20-29 MIN: ICD-10-PCS | Mod: 95,,, | Performed by: FAMILY MEDICINE

## 2023-01-10 PROCEDURE — 99213 OFFICE O/P EST LOW 20 MIN: CPT | Mod: 95,,, | Performed by: FAMILY MEDICINE

## 2023-01-10 SDOH — SOCIAL DETERMINANTS OF HEALTH (SDOH): OTHER PHYSICAL AND MENTAL STRAIN RELATED TO WORK: Z56.6

## 2023-01-10 NOTE — PROGRESS NOTES
The patient location is: [place]  The chief complaint leading to consultation is: Follow up,     Visit type: audiovisual converted to audio only due to delay in connection    Face to Face time with patient: 15 minutes  25 minutes of total time spent on the encounter, which includes face to face time and non-face to face time preparing to see the patient (eg, review of tests), Obtaining and/or reviewing separately obtained history, Documenting clinical information in the electronic or other health record, Independently interpreting results (not separately reported) and communicating results to the patient/family/caregiver, or Care coordination (not separately reported).         Each patient to whom he or she provides medical services by telemedicine is:  (1) informed of the relationship between the physician and patient and the respective role of any other health care provider with respect to management of the patient; and (2) notified that he or she may decline to receive medical services by telemedicine and may withdraw from such care at any time.    Notes:     Subjective:       Patient ID: William Hernandez is a 57 y.o. female.    Chief Complaint: Follow-up    Sleep has improved since she has been off of work for 3 weeks. Feels she is ready to return to work, and would like to return to full time schedule. Still with anxiety, but she has had improvement in the situational anxiety with break from work to address certain aspects of personal life.    Depression Patient Health Questionnaire 10/10/2022   Over the last two weeks how often have you been bothered by little interest or pleasure in doing things Not at all   Over the last two weeks how often have you been bothered by feeling down, depressed or hopeless Not at all   PHQ-2 Total Score 0     Review of Systems   Constitutional:  Negative for activity change and unexpected weight change.   HENT:  Positive for hearing loss. Negative for rhinorrhea and trouble swallowing.     Eyes:  Negative for discharge and visual disturbance.   Respiratory:  Negative for chest tightness and wheezing.    Cardiovascular:  Positive for palpitations. Negative for chest pain.   Gastrointestinal:  Negative for blood in stool, constipation, diarrhea and vomiting.   Endocrine: Negative for polydipsia and polyuria.   Genitourinary:  Negative for difficulty urinating, dysuria, hematuria and menstrual problem.   Musculoskeletal:  Positive for neck pain. Negative for arthralgias and joint swelling.   Neurological:  Positive for weakness. Negative for headaches.   Psychiatric/Behavioral:  Negative for confusion and dysphoric mood.    All other systems reviewed and are negative.      Past Medical History:   Diagnosis Date    Allergy     Autoimmune hepatitis     Bulging lumbar disc     Cancer     Osteoporosis      Past Surgical History:   Procedure Laterality Date    APPENDECTOMY      BREAST SURGERY  2011    Bilateral mastecomy     SECTION  2001    masectomy      TONSILLECTOMY      TUBAL LIGATION  2012    Estimated date     Family History   Problem Relation Age of Onset    Irritable bowel syndrome Mother     Arthritis Mother     Depression Mother     Hearing loss Mother     Miscarriages / Stillbirths Mother     Heart disease Father     Stroke Father     Diabetes Paternal Grandmother        Review of patient's allergies indicates:   Allergen Reactions    Amoxicillin Rash    Cefuroxime axetil Rash    Cephalexin      Other reaction(s): GI Intolerance  UPSET STOMACH  Other reaction(s): GI Intolerance  UPSET STOMACH         Current Outpatient Medications:     calcium carbonate 1250 MG capsule, Take 1,250 mg by mouth., Disp: , Rfl:     cholecalciferol, vitamin D3, (VITAMIN D3) 25 mcg (1,000 unit) capsule, Take 2,000 Units by mouth., Disp: , Rfl:     estradioL (ESTRACE) 0.01 % (0.1 mg/gram) vaginal cream, Place 1 g vaginally once daily., Disp: 42.5 g, Rfl: 2     loratadine 10 mg Cap, , Disp: , Rfl:     LORazepam (ATIVAN) 1 MG tablet, Take 0.5 tablets (0.5 mg total) by mouth every 12 (twelve) hours as needed for Anxiety (or restlessness). Can increase to a whole tablet if needed., Disp: 30 tablet, Rfl: 0    multivitamin capsule, Take 1 capsule by mouth once daily., Disp: , Rfl:     predniSONE (DELTASONE) 5 MG tablet, Take 1 tablet (5 mg total) by mouth once daily., Disp: 90 tablet, Rfl: 1    traZODone (DESYREL) 50 MG tablet, Take 1 tablet (50 mg total) by mouth nightly as needed for Insomnia., Disp: 90 tablet, Rfl: 1    vit C/Zn gluc/herbal no.325 (ELDERBERRY ZINC VIT C MM), , Disp: , Rfl:       Objective:      There were no vitals taken for this visit.  Physical Exam  Constitutional:       General: She is not in acute distress.  Pulmonary:      Effort: Pulmonary effort is normal. No respiratory distress.   Neurological:      Mental Status: She is alert and oriented to person, place, and time.   Psychiatric:         Mood and Affect: Mood normal.         Behavior: Behavior normal.         Thought Content: Thought content normal.         Judgment: Judgment normal.       Assessment:       1. Situational anxiety    2. Stress at work    3. Anxiety        Plan:       Situational anxiety    Stress at work    Anxiety      Recommend setting realistic boundaries with work and personal time, as sleep needs to remain a priority. Also recommend regular exercise, wether cardiovascular or strengthening workouts, to help manage stress and anxiety/depression.    She may return to work tomorrow, 1/11/23, and she will get the fax and contact in for for us to send the work excuse/RTW letter.          Previous records in Epic were reviewed, including the last 3 months of encounters, imaging, laboratory, and pathology reports.    Strict return precautions reviewed and patient verbalized understanding. Risks, benefits, and alternatives to the plan were reviewed in detail and all questions  answered to the patient's satisfaction. Patient agrees to return to clinic or ER if symptoms worsen. 25 minutes total were spent on today's visit, not limited to but including time based on counseling and coordination of care.    Patient instructed that best way to communicate with my office staff is for patient to get on the [facility] epic patient portal to expedite communication and communication issues that may occur.  Patient was given instructions on how to get on the portal.  I encouraged patient to obtain portal access as well.  Ultimately it is up to the patient to obtain access.  Patient voiced understanding.    This note was created using [employer] voice recognition software that occasionally may misinterpret phrases or words.    Follow up in about 6 weeks (around 2/21/2023) for follow up stress, sleep.

## 2023-01-10 NOTE — LETTER
January 10, 2023      [facility]  [address] [place] [address]  Phone: [phone]       Patient: William Hernandez   YOB: 1965  Date of Visit: 01/10/2023    To Whom It May Concern:    Rafael Hernandez  was at [facility] on 01/10/2023. The patient may return to work on 1/11/2023 with no restrictions. If you have any questions or concerns, or if I can be of further assistance, please do not hesitate to contact me.    Sincerely,    Ada Arroyo MD

## 2023-01-14 ENCOUNTER — PATIENT MESSAGE (OUTPATIENT)
Dept: FAMILY MEDICINE | Facility: CLINIC | Age: 58
End: 2023-01-14
Payer: COMMERCIAL

## 2023-01-17 ENCOUNTER — PATIENT MESSAGE (OUTPATIENT)
Dept: ADMINISTRATIVE | Facility: HOSPITAL | Age: 58
End: 2023-01-17
Payer: COMMERCIAL

## 2023-01-24 ENCOUNTER — DOCUMENTATION ONLY (OUTPATIENT)
Dept: FAMILY MEDICINE | Facility: CLINIC | Age: 58
End: 2023-01-24

## 2023-01-24 ENCOUNTER — LAB VISIT (OUTPATIENT)
Dept: FAMILY MEDICINE | Facility: CLINIC | Age: 58
End: 2023-01-24
Payer: COMMERCIAL

## 2023-01-24 DIAGNOSIS — R73.9 HYPERGLYCEMIA: ICD-10-CM

## 2023-01-24 LAB
ESTIMATED AVG GLUCOSE: 108 MG/DL (ref 68–131)
HBA1C MFR BLD: 5.4 % (ref 4–5.6)

## 2023-01-24 PROCEDURE — 83036 HEMOGLOBIN GLYCOSYLATED A1C: CPT | Performed by: FAMILY MEDICINE

## 2023-01-24 NOTE — NURSING
"Disability and life claim paper work faxed to [phone] with success confirmation    Your fax has been successfully sent to [phone] at [phone].  ------------------------------------------------------------  From: [id number]  ------------------------------------------------------------  1/24/2023 3:33:30 PM Transmission Record          Sent to [phone] with remote ID "Barbeau    "          Result: (0/339;0/0) Success          Page record: 1 - 9          Elapsed time: 04:03 on channel 1  "

## 2023-01-30 ENCOUNTER — PATIENT MESSAGE (OUTPATIENT)
Dept: FAMILY MEDICINE | Facility: CLINIC | Age: 58
End: 2023-01-30
Payer: COMMERCIAL

## 2023-01-31 ENCOUNTER — OFFICE VISIT (OUTPATIENT)
Dept: FAMILY MEDICINE | Facility: CLINIC | Age: 58
End: 2023-01-31
Payer: COMMERCIAL

## 2023-01-31 VITALS
HEART RATE: 67 BPM | OXYGEN SATURATION: 100 % | SYSTOLIC BLOOD PRESSURE: 120 MMHG | WEIGHT: 149.06 LBS | HEIGHT: 66 IN | BODY MASS INDEX: 23.95 KG/M2 | DIASTOLIC BLOOD PRESSURE: 64 MMHG | TEMPERATURE: 98 F

## 2023-01-31 DIAGNOSIS — R11.0 POSTPRANDIAL NAUSEA: ICD-10-CM

## 2023-01-31 DIAGNOSIS — M81.8 OSTEOPOROSIS DUE TO AROMATASE INHIBITOR: ICD-10-CM

## 2023-01-31 DIAGNOSIS — R07.9 CHEST PAIN, UNSPECIFIED TYPE: ICD-10-CM

## 2023-01-31 DIAGNOSIS — R07.9 CHEST PAIN AT REST: ICD-10-CM

## 2023-01-31 DIAGNOSIS — Z13.6 ENCOUNTER FOR LIPID SCREENING FOR CARDIOVASCULAR DISEASE: ICD-10-CM

## 2023-01-31 DIAGNOSIS — Z13.220 ENCOUNTER FOR LIPID SCREENING FOR CARDIOVASCULAR DISEASE: ICD-10-CM

## 2023-01-31 DIAGNOSIS — K75.4 AUTOIMMUNE HEPATITIS: ICD-10-CM

## 2023-01-31 DIAGNOSIS — R73.9 HYPERGLYCEMIA: Primary | ICD-10-CM

## 2023-01-31 DIAGNOSIS — T38.6X5A OSTEOPOROSIS DUE TO AROMATASE INHIBITOR: ICD-10-CM

## 2023-01-31 PROCEDURE — 99215 OFFICE O/P EST HI 40 MIN: CPT | Mod: S$GLB,,, | Performed by: FAMILY MEDICINE

## 2023-01-31 PROCEDURE — 99215 PR OFFICE/OUTPT VISIT, EST, LEVL V, 40-54 MIN: ICD-10-PCS | Mod: S$GLB,,, | Performed by: FAMILY MEDICINE

## 2023-01-31 PROCEDURE — 99999 PR PBB SHADOW E&M-EST. PATIENT-LVL IV: CPT | Mod: PBBFAC,,, | Performed by: FAMILY MEDICINE

## 2023-01-31 PROCEDURE — 99999 PR PBB SHADOW E&M-EST. PATIENT-LVL IV: ICD-10-PCS | Mod: PBBFAC,,, | Performed by: FAMILY MEDICINE

## 2023-01-31 NOTE — Clinical Note
Schedule stress echo. Also, please schedule the 3hr GTT with insulin drawn q1 hour with the serum glucose. Orders in Epic.

## 2023-01-31 NOTE — PROGRESS NOTES
"  Subjective:       Patient ID: William Hernandez is a 57 y.o. female.    Chief Complaint: Follow-up    Follow up anxiety. Reports recent episode of nausea, upper abd pain, pain b/t shoulder blades, clamminess, occurred around 90 minutes to 2 hours after eating fried eggs, smoked sausage for breakfast. Tried Kaopectate, which helped after an hour or so (after the second dose.) Chest tightness started after she felt anxious. Never had these symptoms in the past. No know hx of GERD, no hx of "gallbladder trouble." Normal gallbladder on 3/2022 abd US. No change in stools. Daily BM.    Sleep has been better, not good but better. Dreaming (for the first time in years), working on screen time, drinks hot tea occ but that causes her to need to go to the bathroom in the middle of the night. Not taking trazodone.         Depression Patient Health Questionnaire 10/10/2022   Over the last two weeks how often have you been bothered by little interest or pleasure in doing things Not at all   Over the last two weeks how often have you been bothered by feeling down, depressed or hopeless Not at all   PHQ-2 Total Score 0     Review of Systems   All other systems reviewed and are negative.      Past Medical History:   Diagnosis Date    Allergy     Autoimmune hepatitis     Bulging lumbar disc     Cancer     Osteoporosis      Past Surgical History:   Procedure Laterality Date    APPENDECTOMY      BREAST SURGERY  2011    Bilateral mastecomy     SECTION  2001    masectomy      TONSILLECTOMY      TUBAL LIGATION  2012    Estimated date     Family History   Problem Relation Age of Onset    Irritable bowel syndrome Mother     Arthritis Mother     Depression Mother     Hearing loss Mother     Miscarriages / Stillbirths Mother     Heart disease Father     Stroke Father     Diabetes Paternal Grandmother        Review of patient's allergies indicates:   Allergen Reactions    Amoxicillin Rash    Cefuroxime axetil Rash    " "Cephalexin      Other reaction(s): GI Intolerance  UPSET STOMACH  Other reaction(s): GI Intolerance  UPSET STOMACH         Current Outpatient Medications:     calcium carbonate 1250 MG capsule, Take 1,250 mg by mouth., Disp: , Rfl:     cholecalciferol, vitamin D3, (VITAMIN D3) 25 mcg (1,000 unit) capsule, Take 2,000 Units by mouth., Disp: , Rfl:     loratadine 10 mg Cap, , Disp: , Rfl:     multivitamin capsule, Take 1 capsule by mouth once daily., Disp: , Rfl:     predniSONE (DELTASONE) 5 MG tablet, Take 1 tablet (5 mg total) by mouth once daily., Disp: 90 tablet, Rfl: 1    vit C/Zn gluc/herbal no.325 (ELDERBERRY ZINC VIT C MM), , Disp: , Rfl:     estradioL (ESTRACE) 0.01 % (0.1 mg/gram) vaginal cream, Place 1 g vaginally once daily., Disp: 42.5 g, Rfl: 2    LORazepam (ATIVAN) 1 MG tablet, Take 0.5 tablets (0.5 mg total) by mouth every 12 (twelve) hours as needed for Anxiety (or restlessness). Can increase to a whole tablet if needed., Disp: 30 tablet, Rfl: 0      Objective:      /64 (BP Location: Left arm, Patient Position: Sitting, BP Method: Medium (Manual))   Pulse 67   Temp 98 °F (36.7 °C) (Tympanic)   Ht 5' 6" (1.676 m)   Wt 67.6 kg (149 lb 0.5 oz)   SpO2 100%   BMI 24.05 kg/m²   Physical Exam  Vitals and nursing note reviewed.   Constitutional:       General: She is not in acute distress.     Appearance: Normal appearance. She is normal weight. She is not ill-appearing, toxic-appearing or diaphoretic.   HENT:      Head: Normocephalic and atraumatic.      Nose: Nose normal. No congestion.      Mouth/Throat:      Mouth: Mucous membranes are moist.   Eyes:      General: No scleral icterus.        Right eye: No discharge.         Left eye: No discharge.      Conjunctiva/sclera: Conjunctivae normal.   Cardiovascular:      Rate and Rhythm: Normal rate and regular rhythm.      Pulses: Normal pulses.      Heart sounds: Normal heart sounds. No murmur heard.  Pulmonary:      Effort: Pulmonary effort is " normal.      Breath sounds: Normal breath sounds. No wheezing.   Abdominal:      General: Abdomen is flat. There is no distension.   Musculoskeletal:      Cervical back: Neck supple.      Right lower leg: No edema.      Left lower leg: No edema.   Skin:     General: Skin is warm and dry.      Capillary Refill: Capillary refill takes less than 2 seconds.      Coloration: Skin is not jaundiced.   Neurological:      General: No focal deficit present.      Mental Status: She is alert. Mental status is at baseline.      Motor: No weakness.      Coordination: Coordination normal.   Psychiatric:         Attention and Perception: Attention normal.         Mood and Affect: Mood is anxious. Affect is tearful.         Speech: Speech normal. Speech is not rapid and pressured.         Behavior: Behavior normal. Behavior is cooperative.         Thought Content: Thought content normal. Thought content is not paranoid or delusional. Thought content does not include homicidal or suicidal ideation. Thought content does not include homicidal or suicidal plan.         Cognition and Memory: Cognition and memory normal.         Judgment: Judgment normal.       Assessment:       1. Hyperglycemia    2. Autoimmune hepatitis    3. Osteoporosis due to aromatase inhibitor    4. Postprandial nausea    5. Encounter for lipid screening for cardiovascular disease    6. Chest pain, unspecified type    7. Chest pain at rest          Plan:       Hyperglycemia  -     Glucose tolerance, 3 hours; Future; Expected date: 01/31/2023  -     Insulin, Random; Future; Expected date: 01/31/2023  -     Insulin, Random; Future; Expected date: 01/31/2023  -     Insulin, Random; Future; Expected date: 01/31/2023  -     Insulin, Random; Future; Expected date: 01/31/2023  -     Stress Echo Which stress agent will be used? Pharmacological; Color Flow Doppler? No; Future    Autoimmune hepatitis    Osteoporosis due to aromatase inhibitor  -     Basic Metabolic Panel;  Future; Expected date: 01/31/2023    Postprandial nausea    Encounter for lipid screening for cardiovascular disease  -     Lipid Panel; Future; Expected date: 01/31/2023    Chest pain, unspecified type  -     Stress Echo Which stress agent will be used? Pharmacological; Color Flow Doppler? No; Future    Chest pain at rest  -     Stress Echo Which stress agent will be used? Pharmacological; Color Flow Doppler? No; Future      Stress echo.  3hr GTT with insulin levels.  Routine labs previously ordered in Epic.          Previous records in Epic were reviewed, including the last 3 months of encounters, imaging, laboratory, and pathology reports.    Strict return precautions reviewed and patient verbalized understanding. Risks, benefits, and alternatives to the plan were reviewed in detail and all questions answered to the patient's satisfaction. Patient agrees to return to clinic or ER if symptoms worsen. 45 minutes total were spent on today's visit, not limited to but including time based on counseling and coordination of care.    Patient instructed that best way to communicate with my office staff is for patient to get on the [facility] patient portal to expedite communication and communication issues that may occur.  Patient was given instructions on how to get on the portal.  I encouraged patient to obtain portal access as well.  Ultimately it is up to the patient to obtain access.  Patient voiced understanding.    This note was created using [employer] voice recognition software that occasionally may misinterpret phrases or words.    Follow up in about 4 weeks (around 2/28/2023) for follow up stress echo.

## 2023-02-06 ENCOUNTER — OFFICE VISIT (OUTPATIENT)
Dept: OBSTETRICS AND GYNECOLOGY | Facility: CLINIC | Age: 58
End: 2023-02-06
Payer: COMMERCIAL

## 2023-02-06 VITALS
SYSTOLIC BLOOD PRESSURE: 132 MMHG | HEART RATE: 66 BPM | TEMPERATURE: 98 F | BODY MASS INDEX: 23.89 KG/M2 | WEIGHT: 148.69 LBS | OXYGEN SATURATION: 97 % | HEIGHT: 66 IN | DIASTOLIC BLOOD PRESSURE: 80 MMHG

## 2023-02-06 DIAGNOSIS — N95.2 VAGINAL ATROPHY: ICD-10-CM

## 2023-02-06 DIAGNOSIS — Z85.3 HISTORY OF BREAST CANCER: Primary | ICD-10-CM

## 2023-02-06 DIAGNOSIS — N89.8 VAGINAL DRYNESS: ICD-10-CM

## 2023-02-06 PROCEDURE — 99213 PR OFFICE/OUTPT VISIT, EST, LEVL III, 20-29 MIN: ICD-10-PCS | Mod: S$GLB,,, | Performed by: OBSTETRICS & GYNECOLOGY

## 2023-02-06 PROCEDURE — 99213 OFFICE O/P EST LOW 20 MIN: CPT | Mod: S$GLB,,, | Performed by: OBSTETRICS & GYNECOLOGY

## 2023-02-06 RX ORDER — ESTRADIOL 0.1 MG/G
1 CREAM VAGINAL DAILY
Qty: 42.5 G | Refills: 2 | Status: SHIPPED | OUTPATIENT
Start: 2023-02-06 | End: 2023-02-23

## 2023-02-06 NOTE — PROGRESS NOTES
Subjective:       Patient ID: William Hernandez is a 57 y.o. female.    Chief Complaint: Vaginal Atrophy (She c/o dryness and thinning  of  the skin. In the past she has been unable to have a  pap due to dryness.)  Has only been using t ERT for the last 5 weeks-is down to using it 3x a week-not sexually active and is only using the #3 dilator and introducing it about an inch  Disc anatomy and menopause and lack of sex and disc need to introduce the applicator further and try to use it higher n the vagina daily for the next 2 weeks and then decrease again to 3x a week  Disc need for Pap-anxious about trying it today  HPI  Review of Systems   Genitourinary:  Positive for dyspareunia, vaginal pain and vaginal dryness.       Objective:      Physical Exam  Vitals and nursing note reviewed.   Constitutional:       Appearance: Normal appearance.   HENT:      Head: Normocephalic.   Pulmonary:      Effort: Pulmonary effort is normal.   Neurological:      Mental Status: She is alert.       Assessment:       Problem List Items Addressed This Visit          Renal/    Vaginal dryness       Oncology    History of breast cancer - Primary     Other Visit Diagnoses       Vaginal atrophy                  Plan:       History of breast cancer    Vaginal dryness    Vaginal atrophy    Other orders  -     estradioL (ESTRACE) 0.01 % (0.1 mg/gram) vaginal cream; Place 1 g vaginally once daily.  Dispense: 42.5 g; Refill: 2      Disc anatomy demonstrated proper positioning to maximize depth   Disc cellularity, use and R/B of ERT

## 2023-02-14 ENCOUNTER — TELEPHONE (OUTPATIENT)
Dept: FAMILY MEDICINE | Facility: CLINIC | Age: 58
End: 2023-02-14
Payer: COMMERCIAL

## 2023-02-14 ENCOUNTER — PATIENT MESSAGE (OUTPATIENT)
Dept: FAMILY MEDICINE | Facility: CLINIC | Age: 58
End: 2023-02-14
Payer: COMMERCIAL

## 2023-02-14 NOTE — TELEPHONE ENCOUNTER
----- Message from Ada Arroyo MD sent at 2/12/2023  7:51 PM CST -----  Schedule stress echo. Also, please schedule the 3hr GTT with insulin drawn q1 hour with the serum glucose. Orders in Epic.

## 2023-02-23 RX ORDER — ESTRADIOL 0.1 MG/G
1 CREAM VAGINAL DAILY
Qty: 90 G | Refills: 3 | Status: SHIPPED | OUTPATIENT
Start: 2023-02-23 | End: 2023-02-24

## 2023-02-27 ENCOUNTER — LAB VISIT (OUTPATIENT)
Dept: LAB | Facility: HOSPITAL | Age: 58
End: 2023-02-27
Attending: FAMILY MEDICINE
Payer: COMMERCIAL

## 2023-02-27 DIAGNOSIS — M81.8 OSTEOPOROSIS DUE TO AROMATASE INHIBITOR: ICD-10-CM

## 2023-02-27 DIAGNOSIS — Z13.220 ENCOUNTER FOR LIPID SCREENING FOR CARDIOVASCULAR DISEASE: ICD-10-CM

## 2023-02-27 DIAGNOSIS — R73.9 HYPERGLYCEMIA: ICD-10-CM

## 2023-02-27 DIAGNOSIS — T38.6X5A OSTEOPOROSIS DUE TO AROMATASE INHIBITOR: ICD-10-CM

## 2023-02-27 DIAGNOSIS — Z13.6 ENCOUNTER FOR LIPID SCREENING FOR CARDIOVASCULAR DISEASE: ICD-10-CM

## 2023-02-27 LAB
ANION GAP SERPL CALC-SCNC: 8 MMOL/L (ref 8–16)
BUN SERPL-MCNC: 17 MG/DL (ref 6–20)
CALCIUM SERPL-MCNC: 9 MG/DL (ref 8.7–10.5)
CHLORIDE SERPL-SCNC: 107 MMOL/L (ref 95–110)
CHOLEST SERPL-MCNC: 171 MG/DL (ref 120–199)
CHOLEST/HDLC SERPL: 3.1 {RATIO} (ref 2–5)
CO2 SERPL-SCNC: 27 MMOL/L (ref 23–29)
CREAT SERPL-MCNC: 0.8 MG/DL (ref 0.5–1.4)
EST. GFR  (NO RACE VARIABLE): >60 ML/MIN/1.73 M^2
GLUCOSE SERPL-MCNC: 134 MG/DL
GLUCOSE SERPL-MCNC: 84 MG/DL (ref 70–110)
GLUCOSE SERPL-MCNC: 84 MG/DL (ref 70–110)
GLUCOSE SERPL-MCNC: 94 MG/DL
GLUCOSE SERPL-MCNC: 97 MG/DL
HDLC SERPL-MCNC: 55 MG/DL (ref 40–75)
HDLC SERPL: 32.2 % (ref 20–50)
INSULIN COLLECTION INTERVAL: ABNORMAL
INSULIN COLLECTION INTERVAL: NORMAL
INSULIN SERPL-ACNC: 101.6 UU/ML
INSULIN SERPL-ACNC: 40.1 UU/ML
INSULIN SERPL-ACNC: 6.6 UU/ML
INSULIN SERPL-ACNC: 72.9 UU/ML
LDLC SERPL CALC-MCNC: 102.2 MG/DL (ref 63–159)
NONHDLC SERPL-MCNC: 116 MG/DL
POTASSIUM SERPL-SCNC: 4.1 MMOL/L (ref 3.5–5.1)
SODIUM SERPL-SCNC: 142 MMOL/L (ref 136–145)
TRIGL SERPL-MCNC: 69 MG/DL (ref 30–150)

## 2023-02-27 PROCEDURE — 83525 ASSAY OF INSULIN: CPT | Performed by: FAMILY MEDICINE

## 2023-02-27 PROCEDURE — 82951 GLUCOSE TOLERANCE TEST (GTT): CPT | Performed by: FAMILY MEDICINE

## 2023-02-27 PROCEDURE — 36415 COLL VENOUS BLD VENIPUNCTURE: CPT | Performed by: FAMILY MEDICINE

## 2023-02-27 PROCEDURE — 82952 GTT-ADDED SAMPLES: CPT | Performed by: FAMILY MEDICINE

## 2023-02-27 PROCEDURE — 80061 LIPID PANEL: CPT | Performed by: FAMILY MEDICINE

## 2023-02-27 PROCEDURE — 80048 BASIC METABOLIC PNL TOTAL CA: CPT | Performed by: FAMILY MEDICINE

## 2023-02-28 NOTE — PROGRESS NOTES
Your insulin spike was high after consumption of the glucose drink. These results are suggestive of insulin resistance. I recommend a low-carbohydrate diet, and if symptoms do not improve, we can discuss endocrinology referral and/or starting metformin.    Dr. HDEZ

## 2023-03-02 ENCOUNTER — PATIENT MESSAGE (OUTPATIENT)
Dept: FAMILY MEDICINE | Facility: CLINIC | Age: 58
End: 2023-03-02
Payer: COMMERCIAL

## 2023-03-02 NOTE — TELEPHONE ENCOUNTER
Dear Dr. Hernandez,     In the absence of Dr. Ada Simmons, can you please address this patients concern or request?    Thank you,  Nhung Walter MA

## 2023-04-04 ENCOUNTER — LAB VISIT (OUTPATIENT)
Dept: LAB | Facility: HOSPITAL | Age: 58
End: 2023-04-04
Payer: COMMERCIAL

## 2023-04-04 DIAGNOSIS — K75.4 AUTOIMMUNE HEPATITIS: ICD-10-CM

## 2023-04-04 LAB
ALBUMIN SERPL BCP-MCNC: 3.8 G/DL (ref 3.5–5.2)
ALP SERPL-CCNC: 43 U/L (ref 55–135)
ALT SERPL W/O P-5'-P-CCNC: 38 U/L (ref 10–44)
ANION GAP SERPL CALC-SCNC: 11 MMOL/L (ref 8–16)
AST SERPL-CCNC: 31 U/L (ref 10–40)
BASOPHILS # BLD AUTO: 0.04 K/UL (ref 0–0.2)
BASOPHILS NFR BLD: 0.8 % (ref 0–1.9)
BILIRUB SERPL-MCNC: 0.4 MG/DL (ref 0.1–1)
BUN SERPL-MCNC: 21 MG/DL (ref 6–20)
CALCIUM SERPL-MCNC: 9.1 MG/DL (ref 8.7–10.5)
CHLORIDE SERPL-SCNC: 108 MMOL/L (ref 95–110)
CO2 SERPL-SCNC: 24 MMOL/L (ref 23–29)
CREAT SERPL-MCNC: 0.8 MG/DL (ref 0.5–1.4)
DIFFERENTIAL METHOD: ABNORMAL
EOSINOPHIL # BLD AUTO: 0.2 K/UL (ref 0–0.5)
EOSINOPHIL NFR BLD: 3.4 % (ref 0–8)
ERYTHROCYTE [DISTWIDTH] IN BLOOD BY AUTOMATED COUNT: 13.2 % (ref 11.5–14.5)
EST. GFR  (NO RACE VARIABLE): >60 ML/MIN/1.73 M^2
GLUCOSE SERPL-MCNC: 83 MG/DL (ref 70–110)
HCT VFR BLD AUTO: 45 % (ref 37–48.5)
HGB BLD-MCNC: 14.5 G/DL (ref 12–16)
IMM GRANULOCYTES # BLD AUTO: 0.04 K/UL (ref 0–0.04)
IMM GRANULOCYTES NFR BLD AUTO: 0.8 % (ref 0–0.5)
INR PPP: 1.1 (ref 0.8–1.2)
LYMPHOCYTES # BLD AUTO: 1.8 K/UL (ref 1–4.8)
LYMPHOCYTES NFR BLD: 36.2 % (ref 18–48)
MCH RBC QN AUTO: 31.3 PG (ref 27–31)
MCHC RBC AUTO-ENTMCNC: 32.2 G/DL (ref 32–36)
MCV RBC AUTO: 97 FL (ref 82–98)
MONOCYTES # BLD AUTO: 0.5 K/UL (ref 0.3–1)
MONOCYTES NFR BLD: 10.9 % (ref 4–15)
NEUTROPHILS # BLD AUTO: 2.4 K/UL (ref 1.8–7.7)
NEUTROPHILS NFR BLD: 47.9 % (ref 38–73)
NRBC BLD-RTO: 0 /100 WBC
PLATELET # BLD AUTO: 240 K/UL (ref 150–450)
PMV BLD AUTO: 9.6 FL (ref 9.2–12.9)
POTASSIUM SERPL-SCNC: 4.2 MMOL/L (ref 3.5–5.1)
PROT SERPL-MCNC: 6.7 G/DL (ref 6–8.4)
PROTHROMBIN TIME: 11.3 SEC (ref 9–12.5)
RBC # BLD AUTO: 4.63 M/UL (ref 4–5.4)
SODIUM SERPL-SCNC: 143 MMOL/L (ref 136–145)
WBC # BLD AUTO: 4.95 K/UL (ref 3.9–12.7)

## 2023-04-04 PROCEDURE — 36415 COLL VENOUS BLD VENIPUNCTURE: CPT | Performed by: INTERNAL MEDICINE

## 2023-04-04 PROCEDURE — 80053 COMPREHEN METABOLIC PANEL: CPT | Performed by: INTERNAL MEDICINE

## 2023-04-04 PROCEDURE — 85610 PROTHROMBIN TIME: CPT | Performed by: INTERNAL MEDICINE

## 2023-04-04 PROCEDURE — 85025 COMPLETE CBC W/AUTO DIFF WBC: CPT | Performed by: INTERNAL MEDICINE

## 2023-04-11 ENCOUNTER — PATIENT MESSAGE (OUTPATIENT)
Dept: FAMILY MEDICINE | Facility: CLINIC | Age: 58
End: 2023-04-11
Payer: COMMERCIAL

## 2023-04-11 ENCOUNTER — PATIENT MESSAGE (OUTPATIENT)
Dept: ADMINISTRATIVE | Facility: HOSPITAL | Age: 58
End: 2023-04-11
Payer: COMMERCIAL

## 2023-04-19 ENCOUNTER — TELEPHONE (OUTPATIENT)
Dept: LAB | Facility: HOSPITAL | Age: 58
End: 2023-04-19
Payer: COMMERCIAL

## 2023-04-19 NOTE — TELEPHONE ENCOUNTER
----- Message from Ania Shanks sent at 4/19/2023  4:05 PM CDT -----  Contact: [phone]  Type: Needs Medical Advice  Who Called:  Pt     Best Call Back Number: [phone]    Additional Information: Pt requesting a call back to schedule her prolia injection. Pt stated she has been trying to get this done for a month. Pls call back and advise. Pt stated she is past her date of when she needs her injection.

## 2023-04-20 ENCOUNTER — TELEPHONE (OUTPATIENT)
Dept: FAMILY MEDICINE | Facility: CLINIC | Age: 58
End: 2023-04-20
Payer: COMMERCIAL

## 2023-04-20 NOTE — TELEPHONE ENCOUNTER
----- Message from Paulette Ramos sent at 4/20/2023  1:42 PM CDT -----  Regarding: Prolia injection  Pt stopped by the clinic today 04/20/2023 to ask about the messages she has been leaving for the Prolia injection. Stated she has left several messages and stop by the clinic early this week and still has not heard back. Pt would like an update on the Prolia injection as she is 1 month over due.

## 2023-04-20 NOTE — TELEPHONE ENCOUNTER
Spoke with Patient and still has not been scheduled. It has been over a month and she has not heard anything. Can you help me?

## 2023-04-26 ENCOUNTER — INFUSION (OUTPATIENT)
Dept: INFUSION THERAPY | Facility: HOSPITAL | Age: 58
End: 2023-04-26
Attending: FAMILY MEDICINE
Payer: COMMERCIAL

## 2023-04-26 VITALS
RESPIRATION RATE: 18 BRPM | SYSTOLIC BLOOD PRESSURE: 115 MMHG | DIASTOLIC BLOOD PRESSURE: 70 MMHG | OXYGEN SATURATION: 95 % | TEMPERATURE: 98 F | HEART RATE: 75 BPM

## 2023-04-26 DIAGNOSIS — M81.8 OSTEOPOROSIS DUE TO AROMATASE INHIBITOR: Primary | ICD-10-CM

## 2023-04-26 DIAGNOSIS — T38.6X5A OSTEOPOROSIS DUE TO AROMATASE INHIBITOR: Primary | ICD-10-CM

## 2023-04-26 PROCEDURE — 63600175 PHARM REV CODE 636 W HCPCS: Mod: JZ,JG | Performed by: FAMILY MEDICINE

## 2023-04-26 PROCEDURE — 96372 THER/PROPH/DIAG INJ SC/IM: CPT

## 2023-04-26 RX ADMIN — DENOSUMAB 60 MG: 60 INJECTION SUBCUTANEOUS at 08:04

## 2023-04-28 LAB — CRC RECOMMENDATION EXT: NORMAL

## 2023-05-15 ENCOUNTER — OFFICE VISIT (OUTPATIENT)
Dept: OBSTETRICS AND GYNECOLOGY | Facility: CLINIC | Age: 58
End: 2023-05-15
Payer: COMMERCIAL

## 2023-05-15 VITALS
HEIGHT: 65 IN | BODY MASS INDEX: 23.64 KG/M2 | SYSTOLIC BLOOD PRESSURE: 112 MMHG | WEIGHT: 141.88 LBS | DIASTOLIC BLOOD PRESSURE: 57 MMHG

## 2023-05-15 DIAGNOSIS — N89.8 VAGINAL DRYNESS: Primary | ICD-10-CM

## 2023-05-15 DIAGNOSIS — N94.19 DYSPAREUNIA DUE TO MEDICAL CONDITION IN FEMALE: ICD-10-CM

## 2023-05-15 DIAGNOSIS — N89.5 VAGINAL STENOSIS: ICD-10-CM

## 2023-05-15 DIAGNOSIS — N39.46 MIXED STRESS AND URGE URINARY INCONTINENCE: ICD-10-CM

## 2023-05-15 DIAGNOSIS — Z85.3 HISTORY OF BREAST CANCER: ICD-10-CM

## 2023-05-15 DIAGNOSIS — N95.2 VAGINAL ATROPHY: ICD-10-CM

## 2023-05-15 DIAGNOSIS — Z01.419 WOMEN'S ANNUAL ROUTINE GYNECOLOGICAL EXAMINATION: ICD-10-CM

## 2023-05-15 PROCEDURE — 88175 CYTOPATH C/V AUTO FLUID REDO: CPT | Performed by: OBSTETRICS & GYNECOLOGY

## 2023-05-15 PROCEDURE — 87624 HPV HI-RISK TYP POOLED RSLT: CPT | Performed by: OBSTETRICS & GYNECOLOGY

## 2023-05-15 PROCEDURE — 99396 PR PREVENTIVE VISIT,EST,40-64: ICD-10-PCS | Mod: S$GLB,,, | Performed by: OBSTETRICS & GYNECOLOGY

## 2023-05-15 PROCEDURE — 99396 PREV VISIT EST AGE 40-64: CPT | Mod: S$GLB,,, | Performed by: OBSTETRICS & GYNECOLOGY

## 2023-05-15 RX ORDER — LANOLIN ALCOHOL/MO/W.PET/CERES
1 CREAM (GRAM) TOPICAL 2 TIMES DAILY
COMMUNITY

## 2023-05-15 RX ORDER — ESTRADIOL 0.1 MG/G
1 CREAM VAGINAL DAILY
Qty: 42.5 G | Refills: 1 | Status: SHIPPED | OUTPATIENT
Start: 2023-05-15 | End: 2024-05-14

## 2023-05-15 RX ORDER — ESTRADIOL 0.1 MG/G
1 CREAM VAGINAL DAILY
Qty: 85 G | Refills: 3 | Status: SHIPPED | OUTPATIENT
Start: 2023-05-15 | End: 2023-10-15

## 2023-05-15 NOTE — PROGRESS NOTES
Subjective     Patient ID: William Hernandez is a 58 y.o. female.    Chief Complaint: No chief complaint on file.    HPIPt is here for GYN exam, has been using vaginal estrogen and dilators for the last 3 months, is able to introduce the larger dilator but it is not able to be inserted fully, has no bleeding, has not tried sexual activity, has some bladder incontinence and it can be spontaneous and also stress related, is weaning Prednisone to 4 mg daily- has also been doing pretty well with liver enzymes, GI doctor watches cholesterol, exercises some but is often inconsistent  Review of Systems   Genitourinary:  Positive for bladder incontinence, dyspareunia and vaginal dryness. Negative for vaginal bleeding, vaginal discharge and vaginal pain.        Objective     Physical Exam  Vitals and nursing note reviewed. Exam conducted with a chaperone present.   Constitutional:       Appearance: Normal appearance.   HENT:      Head: Normocephalic and atraumatic.   Cardiovascular:      Rate and Rhythm: Normal rate and regular rhythm.      Heart sounds: Normal heart sounds.   Pulmonary:      Effort: Pulmonary effort is normal.      Breath sounds: Normal breath sounds.   Chest:   Breasts:     Right: Normal.      Left: Normal.      Comments: Bilateral reconstruction  Abdominal:      General: Abdomen is flat.      Palpations: Abdomen is soft.   Genitourinary:     General: Normal vulva.      Exam position: Lithotomy position.      Uterus: Normal.       Adnexa: Right adnexa normal and left adnexa normal.      Comments: Vagina is stenotic but much improved, pt is able to tolerate a pediatric speculum , not able to open and visualize the cervix, unable to palpate the cervix, no masses, pale mucosa and petechiae at the apex of vault, BME-narrowing of vagina as I moved superiorly-pt danie well no masses felt or seen  Musculoskeletal:      Cervical back: Normal range of motion and neck supple.   Lymphadenopathy:      Upper Body:      Right  upper body: No axillary or pectoral adenopathy.      Left upper body: No axillary or pectoral adenopathy.   Neurological:      Mental Status: She is alert.          Assessment and Plan     Problem List Items Addressed This Visit          Renal/    Vaginal dryness - Primary    Dyspareunia due to medical condition in female       Oncology    History of breast cancer     Other Visit Diagnoses       Vaginal atrophy        Women's annual routine gynecological examination        Relevant Orders    Liquid-Based Pap Smear, Screening    HPV High Risk Genotypes, PCR    Vaginal stenosis        Relevant Orders    Ambulatory referral/consult to Physical/Occupational Therapy    Mixed stress and urge urinary incontinence        Relevant Orders    Ambulatory referral/consult to Physical/Occupational Therapy            Vaginal dryness    Vaginal atrophy    Dyspareunia due to medical condition in female    History of breast cancer    Women's annual routine gynecological examination  -     Liquid-Based Pap Smear, Screening  -     HPV High Risk Genotypes, PCR    Vaginal stenosis  -     Ambulatory referral/consult to Physical/Occupational Therapy; Future; Expected date: 05/22/2023    Mixed stress and urge urinary incontinence  -     Ambulatory referral/consult to Physical/Occupational Therapy; Future; Expected date: 05/22/2023    Other orders  -     estradioL (ESTRACE) 0.01 % (0.1 mg/gram) vaginal cream; Place 1 g vaginally once daily.  Dispense: 42.5 g; Refill: 1  -     estradioL (ESTRACE) 0.01 % (0.1 mg/gram) vaginal cream; Place 1 g vaginally once daily.  Dispense: 85 g; Refill: 3      RTC 3 months to eval after PT and if she needs further  eval

## 2023-05-19 LAB
HPV HR 12 DNA SPEC QL NAA+PROBE: NEGATIVE
HPV16 AG SPEC QL: NEGATIVE
HPV18 DNA SPEC QL NAA+PROBE: NEGATIVE

## 2023-05-20 LAB
FINAL PATHOLOGIC DIAGNOSIS: NORMAL
Lab: NORMAL

## 2023-09-05 ENCOUNTER — PATIENT OUTREACH (OUTPATIENT)
Dept: ADMINISTRATIVE | Facility: HOSPITAL | Age: 58
End: 2023-09-05
Payer: COMMERCIAL

## 2023-09-05 NOTE — PROGRESS NOTES
Population Health Chart Review & Patient Outreach Details:     Reason for Outreach Encounter:     []  Non-Compliant Report   []  Payor Report ([employer], PHN, BCBS, MSSP, MCIP, UHC, etc.)   [x]  Pre-Visit Chart Review     Updates Requested / Reviewed:     []  Care Everywhere    []     []  External Sources ([facility], [facility], DIS, etc.)   [x]  Care Team Updated    Patient Outreach Method:    []  Telephone Outreach Completed   [] Successful   [] Left Voicemail   [] Unable to Contact (wrong number, no voicemail)  []  [employer] Portal Outreach Sent  []  Letter Outreach Mailed  []  Fax Sent for External Records  [x]  External Records Upload    Health Maintenance Topics Addressed and Outreach Outcomes / Actions Taken:        []      Breast Cancer Screening []  Mammo Scheduled      []  External Records Requested     []  Added Reminder to Complete to Upcoming Primary Care Appt Notes     []  Patient Declined     []  Patient Will Call Back to Schedule     []  Patient Will Schedule with External Provider / Order Routed if Applicable             []       Cervical Cancer Screening []  Pap Scheduled      []  External Records Requested     []  Added Reminder to Complete to Upcoming Primary Care Appt Notes     []  Patient Declined     []  Patient Will Call Back to Schedule     []  Patient Will Schedule with External Provider               [x]          Colorectal Cancer Screening []  Colonoscopy Case Request or Referral Placed     []  External Records Requested     []  Added Reminder to Complete to Upcoming Primary Care Appt Notes     []  Patient Declined     []  Patient Will Call Back to Schedule     []  Patient Will Schedule with External Provider     []  Fit Kit Mailed (add the SmartPhrase under additional notes)     []  Reminded Patient to Complete Home Test             []      Diabetic Eye Exam []  Eye Camera Scheduled or Optometry Referral Placed     []  External Records Requested     []  Added Reminder to Complete to  Upcoming Primary Care Appt Notes     []  Patient Declined     []  Patient Will Call Back to Schedule     []  Patient Will Schedule with External Provider             []      Blood Pressure Control []  Primary Care Follow Up Visit Scheduled     []  Remote Blood Pressure Reading Captured     []  Added Reminder to Complete to Upcoming Primary Care Appt Notes     []  Patient Declined     []  Patient Will Call Back / Patient Will Send Portal Message with Reading     []  Patient Will Call Back to Schedule Provider Visit             []       HbA1c & Other Labs []  Lab Appt Scheduled for Due Labs     []  Primary Care Follow Up Visit Scheduled      []  Reminded Patient to Complete Home Test     []  Added Reminder to Complete to Upcoming Primary Care Appt Notes     []  Patient Declined     []  Patient Will Call Back to Schedule     []  Patient Will Schedule with External Provider / Order Routed if Applicable           []    Schedule Primary Care Appt []  Primary Care Appt Scheduled     []  Patient Declined     []  Patient Will Call Back to Schedule     []  Pt Established with External Provider & Updated Care Team             []      Medication Adherence []  Primary Care Appointment Scheduled     []  Added Reminder to Upcoming Primary Care Appt Notes     []  Patient Reminded to  Prescription     []  Patient Declined, Provider Notified if Needed     []  Sent Provider Message to Review and/or Add Exclusion to Problem List             []      Osteoporosis Screening []  DXA Appointment Scheduled     []  External Records Requested     []  Added Reminder to Complete to Upcoming Primary Care Appt Notes     []  Patient Declined     []  Patient Will Call Back to Schedule     []  Patient Will Schedule with External Provider / Order Routed if Applicable     Additional Care Coordinator Notes:     UPLOADED 04/2023 POST OP VISIT NOTES FROM DR PAREKH WITH COLONOSCOPY FU FREQUENCY    Further Action Needed If Patient Returns  Outreach:

## 2023-09-07 ENCOUNTER — PATIENT MESSAGE (OUTPATIENT)
Dept: FAMILY MEDICINE | Facility: CLINIC | Age: 58
End: 2023-09-07
Payer: COMMERCIAL

## 2023-09-07 DIAGNOSIS — R73.9 HYPERGLYCEMIA: Primary | ICD-10-CM

## 2023-09-07 DIAGNOSIS — T38.6X5A OSTEOPOROSIS DUE TO AROMATASE INHIBITOR: ICD-10-CM

## 2023-09-07 DIAGNOSIS — M81.8 OSTEOPOROSIS DUE TO AROMATASE INHIBITOR: ICD-10-CM

## 2023-09-18 ENCOUNTER — CLINICAL SUPPORT (OUTPATIENT)
Dept: FAMILY MEDICINE | Facility: CLINIC | Age: 58
End: 2023-09-18
Payer: COMMERCIAL

## 2023-09-18 DIAGNOSIS — R73.9 HYPERGLYCEMIA: ICD-10-CM

## 2023-09-18 DIAGNOSIS — T38.6X5A OSTEOPOROSIS DUE TO AROMATASE INHIBITOR: ICD-10-CM

## 2023-09-18 DIAGNOSIS — M81.8 OSTEOPOROSIS DUE TO AROMATASE INHIBITOR: ICD-10-CM

## 2023-09-18 LAB
ANION GAP SERPL CALC-SCNC: 8 MMOL/L (ref 8–16)
BUN SERPL-MCNC: 19 MG/DL (ref 6–20)
CALCIUM SERPL-MCNC: 8.9 MG/DL (ref 8.7–10.5)
CHLORIDE SERPL-SCNC: 109 MMOL/L (ref 95–110)
CO2 SERPL-SCNC: 25 MMOL/L (ref 23–29)
CREAT SERPL-MCNC: 0.8 MG/DL (ref 0.5–1.4)
EST. GFR  (NO RACE VARIABLE): >60 ML/MIN/1.73 M^2
ESTIMATED AVG GLUCOSE: 103 MG/DL (ref 68–131)
GLUCOSE SERPL-MCNC: 90 MG/DL (ref 70–110)
HBA1C MFR BLD: 5.2 % (ref 4–5.6)
POTASSIUM SERPL-SCNC: 4.1 MMOL/L (ref 3.5–5.1)
SODIUM SERPL-SCNC: 142 MMOL/L (ref 136–145)

## 2023-09-18 PROCEDURE — 80048 BASIC METABOLIC PNL TOTAL CA: CPT | Performed by: FAMILY MEDICINE

## 2023-09-18 PROCEDURE — 83036 HEMOGLOBIN GLYCOSYLATED A1C: CPT | Performed by: FAMILY MEDICINE

## 2023-09-27 ENCOUNTER — PATIENT MESSAGE (OUTPATIENT)
Dept: FAMILY MEDICINE | Facility: CLINIC | Age: 58
End: 2023-09-27
Payer: COMMERCIAL

## 2023-09-27 DIAGNOSIS — N39.0 URINARY TRACT INFECTION WITHOUT HEMATURIA, SITE UNSPECIFIED: Primary | ICD-10-CM

## 2023-10-09 ENCOUNTER — OFFICE VISIT (OUTPATIENT)
Dept: FAMILY MEDICINE | Facility: CLINIC | Age: 58
End: 2023-10-09
Payer: COMMERCIAL

## 2023-10-09 DIAGNOSIS — R73.03 PREDIABETES: ICD-10-CM

## 2023-10-09 DIAGNOSIS — G47.9 SLEEP DISORDER WITH MOOD COMPLAINTS: ICD-10-CM

## 2023-10-09 DIAGNOSIS — K75.4 AUTOIMMUNE HEPATITIS: ICD-10-CM

## 2023-10-09 DIAGNOSIS — R73.9 HYPERGLYCEMIA: ICD-10-CM

## 2023-10-09 DIAGNOSIS — F41.9 ANXIETY: Primary | ICD-10-CM

## 2023-10-09 PROCEDURE — 99215 OFFICE O/P EST HI 40 MIN: CPT | Mod: 95,,, | Performed by: FAMILY MEDICINE

## 2023-10-09 PROCEDURE — 99215 PR OFFICE/OUTPT VISIT, EST, LEVL V, 40-54 MIN: ICD-10-PCS | Mod: 95,,, | Performed by: FAMILY MEDICINE

## 2023-10-09 NOTE — PROGRESS NOTES
"The patient location is: [place]  The chief complaint leading to consultation is: follow up anxiety, sleep difficulty    Visit type: audiovisual    Face to Face time with patient: 30 min  40 minutes of total time spent on the encounter, which includes face to face time and non-face to face time preparing to see the patient (eg, review of tests), Obtaining and/or reviewing separately obtained history, Documenting clinical information in the electronic or other health record, Independently interpreting results (not separately reported) and communicating results to the patient/family/caregiver, or Care coordination (not separately reported).         Each patient to whom he or she provides medical services by telemedicine is:  (1) informed of the relationship between the physician and patient and the respective role of any other health care provider with respect to management of the patient; and (2) notified that he or she may decline to receive medical services by telemedicine and may withdraw from such care at any time.    Notes:     Subjective:       Patient ID: William Hernandez is a 58 y.o. female.    Chief Complaint: Follow-up    VV for follow up A1C, weight gain, and stress.    Last A1C 5.2 compared to 5.4 around 3 months ago and 5.6 around 12 months ago. Patient states she has made significant changes to her diet, eliminated rice, pasta, and bread from her diet. Still eating fruits and vegetables including starchy vegetables such as beans. Concerned that she has room for further 'improvement' and requests dietician referral. Home scale showed decrease from 149 to 139. "It came off pretty fast."    She had fasting insulin and 3 hour GTT with serial insulin levels back in Feb 2023.    Has quite a bit of stress with work and family, states she has trouble with sleeping. Has palpitations when trying to drift off to sleep. Also has panic attack symptoms during her workday. She has taken Ativan sparingly over the last several " months. Notes she does not like the way it makes her feel, has tearfulness if she takes even half tab more than 2 days in a row.    Autoimmune hepatitis, on prednisone 4mg, followed by Dr. Patterson with [facility]. Was down to 2mg at one point but the liver enzymes increased.    Leg cramps at night.               10/10/2022    12:02 PM   Depression Patient Health Questionnaire   Over the last two weeks how often have you been bothered by little interest or pleasure in doing things Not at all   Over the last two weeks how often have you been bothered by feeling down, depressed or hopeless Not at all   PHQ-2 Total Score 0          No data to display                Review of Systems   Constitutional:  Positive for activity change. Negative for unexpected weight change.   HENT:  Positive for hearing loss and rhinorrhea. Negative for trouble swallowing.    Eyes:  Positive for visual disturbance. Negative for discharge.   Respiratory:  Negative for chest tightness and wheezing.    Cardiovascular:  Negative for chest pain and palpitations.   Gastrointestinal:  Negative for blood in stool, constipation, diarrhea and vomiting.   Endocrine: Negative for polydipsia and polyuria.   Genitourinary:  Negative for difficulty urinating, dysuria, hematuria and menstrual problem.   Musculoskeletal:  Positive for neck pain. Negative for arthralgias and joint swelling.   Neurological:  Negative for weakness and headaches.   Psychiatric/Behavioral:  Negative for confusion and dysphoric mood.    All other systems reviewed and are negative.        Past Medical History:   Diagnosis Date    Allergy     Autoimmune hepatitis     Bulging lumbar disc     Cancer     Osteoporosis     Personal history of colonic polyps 2023    PER dR [facility] NOTES, IN [place]     Past Surgical History:   Procedure Laterality Date    APPENDECTOMY      BILATERAL MASTECTOMY  2011    BREAST SURGERY  2011    Bilateral mastecomy     SECTION   08/29/2001    COLONOSCOPY W/ POLYPECTOMY N/A 04/28/2023    REPAT IN 5 YRS    masectomy      TONSILLECTOMY      TUBAL LIGATION  01/01/2012    Estimated date     Family History   Problem Relation Age of Onset    Irritable bowel syndrome Mother     Arthritis Mother     Depression Mother     Hearing loss Mother     Miscarriages / Stillbirths Mother     Heart disease Father     Stroke Father     Diabetes Paternal Grandmother        Review of patient's allergies indicates:   Allergen Reactions    Amoxicillin Rash    Cefuroxime axetil Rash    Cephalexin      Other reaction(s): GI Intolerance  UPSET STOMACH  Other reaction(s): GI Intolerance  UPSET STOMACH         Current Outpatient Medications:     calcium carbonate 1250 MG capsule, Take 1,250 mg by mouth., Disp: , Rfl:     calcium citrate-vitamin D3 315-200 mg (CITRACAL+D) 315 mg-5 mcg (200 unit) per tablet, Take 1 tablet by mouth 2 (two) times daily., Disp: , Rfl:     cholecalciferol, vitamin D3, (VITAMIN D3) 25 mcg (1,000 unit) capsule, Take 2,000 Units by mouth., Disp: , Rfl:     estradioL (ESTRACE) 0.01 % (0.1 mg/gram) vaginal cream, Place 1 g vaginally once daily., Disp: 42.5 g, Rfl: 1    estradioL (ESTRACE) 0.01 % (0.1 mg/gram) vaginal cream, Place 1 g vaginally once daily., Disp: 85 g, Rfl: 3    loratadine 10 mg Cap, , Disp: , Rfl:     LORazepam (ATIVAN) 1 MG tablet, Take 0.5 tablets (0.5 mg total) by mouth every 12 (twelve) hours as needed for Anxiety (or restlessness). Can increase to a whole tablet if needed., Disp: 30 tablet, Rfl: 0    multivitamin capsule, Take 1 capsule by mouth once daily., Disp: , Rfl:     predniSONE (DELTASONE) 5 MG tablet, Take 1 tablet (5 mg total) by mouth once daily. (Patient taking differently: Take 4 mg by mouth once daily.), Disp: 90 tablet, Rfl: 1    vit C/Zn gluc/herbal no.325 (ELDERBERRY ZINC VIT C MM), , Disp: , Rfl:       Objective:      There were no vitals taken for this visit.  Physical Exam  Constitutional:       General:  She is not in acute distress.  Pulmonary:      Effort: Pulmonary effort is normal. No respiratory distress.   Neurological:      Mental Status: She is alert and oriented to person, place, and time.   Psychiatric:         Mood and Affect: Affect normal. Mood is depressed.         Speech: Speech normal.         Behavior: Behavior normal.         Thought Content: Thought content normal.         Judgment: Judgment normal.         Assessment:       1. Anxiety    2. Hyperglycemia    3. Prediabetes    4. Sleep disorder with mood complaints    5. Autoimmune hepatitis        Plan:       Anxiety    Hyperglycemia  -     Ambulatory referral/consult to Nutrition Services; Future; Expected date: 10/16/2023    Prediabetes  -     Ambulatory referral/consult to Nutrition Services; Future; Expected date: 10/16/2023    Sleep disorder with mood complaints    Autoimmune hepatitis  -     Ambulatory referral/consult to Nutrition Services; Future; Expected date: 10/16/2023    Sleep med was refilled earlier this week.  Agree with referral to nutritionist. Encouraged low carb diet with lean protein and heart-healthy fats rich in omega-3s.  Continue current meds.        Magnesium glycinate (or magneisum bisglycinate) 400mg nightly.      Previous records in Epic were reviewed, including the last 3 months of encounters, imaging, laboratory, and pathology reports.    Strict return precautions reviewed and patient verbalized understanding. Risks, benefits, and alternatives to the plan were reviewed in detail and all questions answered to the patient's satisfaction. 4Patient agrees to return to clinic or ER if symptoms worsen. 0 minutes total were spent on today's visit, not limited to but including time based on counseling and coordination of care.    Patient instructed that best way to communicate with my office staff is for patient to get on the [facility] patient portal to expedite communication and communication issues that may occur.   Patient was given instructions on how to get on the portal.  I encouraged patient to obtain portal access as well.  Ultimately it is up to the patient to obtain access.  Patient voiced understanding.    This note was created using [employer] voice recognition software that occasionally may misinterpret phrases or words.    Follow up in about 3 months (around 1/9/2024) for follow up.

## 2023-10-15 PROBLEM — G47.9 SLEEP DISORDER WITH MOOD COMPLAINTS: Status: ACTIVE | Noted: 2023-10-15

## 2023-10-16 ENCOUNTER — PATIENT MESSAGE (OUTPATIENT)
Dept: FAMILY MEDICINE | Facility: CLINIC | Age: 58
End: 2023-10-16
Payer: COMMERCIAL

## 2023-10-16 DIAGNOSIS — K75.4 AUTOIMMUNE HEPATITIS: Primary | ICD-10-CM

## 2023-10-23 ENCOUNTER — LAB VISIT (OUTPATIENT)
Dept: FAMILY MEDICINE | Facility: CLINIC | Age: 58
End: 2023-10-23
Payer: COMMERCIAL

## 2023-10-23 DIAGNOSIS — K75.4 AUTOIMMUNE HEPATITIS: ICD-10-CM

## 2023-10-23 LAB
ALBUMIN SERPL BCP-MCNC: 3.9 G/DL (ref 3.5–5.2)
ALP SERPL-CCNC: 49 U/L (ref 55–135)
ALT SERPL W/O P-5'-P-CCNC: 58 U/L (ref 10–44)
AST SERPL-CCNC: 39 U/L (ref 10–40)
BILIRUB DIRECT SERPL-MCNC: 0.2 MG/DL (ref 0.1–0.3)
BILIRUB SERPL-MCNC: 0.4 MG/DL (ref 0.1–1)
PROT SERPL-MCNC: 6.8 G/DL (ref 6–8.4)

## 2023-10-23 PROCEDURE — 80076 HEPATIC FUNCTION PANEL: CPT | Performed by: FAMILY MEDICINE

## 2023-10-26 ENCOUNTER — INFUSION (OUTPATIENT)
Dept: INFUSION THERAPY | Facility: HOSPITAL | Age: 58
End: 2023-10-26
Attending: FAMILY MEDICINE
Payer: COMMERCIAL

## 2023-10-26 VITALS
BODY MASS INDEX: 22.34 KG/M2 | SYSTOLIC BLOOD PRESSURE: 106 MMHG | RESPIRATION RATE: 18 BRPM | DIASTOLIC BLOOD PRESSURE: 56 MMHG | HEART RATE: 72 BPM | TEMPERATURE: 98 F | OXYGEN SATURATION: 98 % | HEIGHT: 66 IN | WEIGHT: 139 LBS

## 2023-10-26 DIAGNOSIS — T38.6X5A OSTEOPOROSIS DUE TO AROMATASE INHIBITOR: Primary | ICD-10-CM

## 2023-10-26 DIAGNOSIS — M81.8 OSTEOPOROSIS DUE TO AROMATASE INHIBITOR: Primary | ICD-10-CM

## 2023-10-26 PROCEDURE — 96372 THER/PROPH/DIAG INJ SC/IM: CPT

## 2023-10-26 PROCEDURE — 63600175 PHARM REV CODE 636 W HCPCS: Mod: JZ,JG | Performed by: FAMILY MEDICINE

## 2023-10-26 RX ORDER — PREDNISONE 1 MG/1
3 TABLET ORAL DAILY
COMMUNITY

## 2023-10-26 RX ADMIN — DENOSUMAB 60 MG: 60 INJECTION SUBCUTANEOUS at 08:10

## 2023-10-26 NOTE — PLAN OF CARE
Problem: Fatigue  Goal: Improved Activity Tolerance  Outcome: Ongoing, Progressing  Intervention: Promote Improved Energy  Flowsheets (Taken 10/26/2023 1771)  Fatigue Management:   activity schedule adjusted   activity assistance provided   fatigue-related activity identified   frequent rest breaks encouraged   paced activity encouraged  Sleep/Rest Enhancement:   awakenings minimized   consistent schedule promoted   natural light exposure provided   noise level reduced   reading promoted   regular sleep/rest pattern promoted   relaxation techniques promoted  Activity Management:   Ambulated -L4   Up in chair - L3

## 2023-11-21 ENCOUNTER — OFFICE VISIT (OUTPATIENT)
Dept: FAMILY MEDICINE | Facility: CLINIC | Age: 58
End: 2023-11-21
Payer: COMMERCIAL

## 2023-11-21 VITALS
HEART RATE: 67 BPM | WEIGHT: 142 LBS | DIASTOLIC BLOOD PRESSURE: 59 MMHG | SYSTOLIC BLOOD PRESSURE: 108 MMHG | HEIGHT: 65 IN | BODY MASS INDEX: 23.66 KG/M2

## 2023-11-21 DIAGNOSIS — N18.2 CKD (CHRONIC KIDNEY DISEASE) STAGE 2, GFR 60-89 ML/MIN: ICD-10-CM

## 2023-11-21 DIAGNOSIS — M79.641 PAIN OF RIGHT HAND: Primary | ICD-10-CM

## 2023-11-21 DIAGNOSIS — M54.12 CERVICAL RADICULOPATHY: ICD-10-CM

## 2023-11-21 DIAGNOSIS — F41.9 ANXIETY: ICD-10-CM

## 2023-11-21 PROCEDURE — 99214 PR OFFICE/OUTPT VISIT, EST, LEVL IV, 30-39 MIN: ICD-10-PCS | Mod: S$GLB,,, | Performed by: FAMILY MEDICINE

## 2023-11-21 PROCEDURE — 99214 OFFICE O/P EST MOD 30 MIN: CPT | Mod: S$GLB,,, | Performed by: FAMILY MEDICINE

## 2023-11-21 PROCEDURE — 99999 PR PBB SHADOW E&M-EST. PATIENT-LVL III: CPT | Mod: PBBFAC,,, | Performed by: FAMILY MEDICINE

## 2023-11-21 PROCEDURE — 99999 PR PBB SHADOW E&M-EST. PATIENT-LVL III: ICD-10-PCS | Mod: PBBFAC,,, | Performed by: FAMILY MEDICINE

## 2023-11-21 RX ORDER — LORAZEPAM 1 MG/1
0.5 TABLET ORAL EVERY 12 HOURS PRN
Qty: 30 TABLET | Refills: 0 | Status: SHIPPED | OUTPATIENT
Start: 2023-11-21 | End: 2024-02-27

## 2023-11-21 RX ORDER — DICLOFENAC SODIUM 10 MG/G
1 GEL TOPICAL 4 TIMES DAILY
Qty: 100 G | Refills: 2 | Status: SHIPPED | OUTPATIENT
Start: 2023-11-21

## 2023-11-21 NOTE — PROGRESS NOTES
Subjective:       Patient ID: William Hernandez is a 58 y.o. female.    Chief Complaint: Follow-up (Stressed, pain in neck radiate to right arm at night)    59yo female here today for follow up anxiety.    Much work stress. This is ongoing.  Has been using a Netflix program for sleep/meditation.  She does not want to take med daily for mood.  Last Rx for Ativan was over a year go.    C/o R neck and shoulder pain radiating down to her arm. Happens at night when she goes to sleep--sleeps L side.    She has not talked with dietician yet.    She has increased her protein and cut out refined carbs.          10/10/2022    12:02 PM   Depression Patient Health Questionnaire   Over the last two weeks how often have you been bothered by little interest or pleasure in doing things Not at all   Over the last two weeks how often have you been bothered by feeling down, depressed or hopeless Not at all   PHQ-2 Total Score 0          No data to display                Review of Systems   All other systems reviewed and are negative.        Past Medical History:   Diagnosis Date    Allergy     Autoimmune hepatitis     Bulging lumbar disc     Cancer     Osteoporosis     Personal history of colonic polyps 2023    PER dR [facility] NOTES, IN [place]     Past Surgical History:   Procedure Laterality Date    APPENDECTOMY      BILATERAL MASTECTOMY  2011    BREAST SURGERY  2011    Bilateral mastecomy     SECTION  2001    COLONOSCOPY W/ POLYPECTOMY N/A 2023    REPAT IN 5 YRS    masectomy      TONSILLECTOMY      TUBAL LIGATION  2012    Estimated date     Family History   Problem Relation Age of Onset    Irritable bowel syndrome Mother     Arthritis Mother     Depression Mother     Hearing loss Mother     Miscarriages / Stillbirths Mother     Heart disease Father     Stroke Father     Diabetes Paternal Grandmother        Review of patient's allergies indicates:   Allergen Reactions    Amoxicillin Rash     "Cefuroxime axetil Rash    Cephalexin      Other reaction(s): GI Intolerance  UPSET STOMACH  Other reaction(s): GI Intolerance  UPSET STOMACH         Current Outpatient Medications:     calcium carbonate 1250 MG capsule, Take 1,250 mg by mouth., Disp: , Rfl:     calcium citrate-vitamin D3 315-200 mg (CITRACAL+D) 315 mg-5 mcg (200 unit) per tablet, Take 1 tablet by mouth 2 (two) times daily., Disp: , Rfl:     cholecalciferol, vitamin D3, (VITAMIN D3) 25 mcg (1,000 unit) capsule, Take 2,000 Units by mouth., Disp: , Rfl:     estradioL (ESTRACE) 0.01 % (0.1 mg/gram) vaginal cream, Place 1 g vaginally once daily., Disp: 42.5 g, Rfl: 1    loratadine 10 mg Cap, , Disp: , Rfl:     multivitamin capsule, Take 1 capsule by mouth once daily., Disp: , Rfl:     predniSONE (DELTASONE) 1 MG tablet, Take 3 mg by mouth once daily. 1 mg x3 by mouth daily Auto immune Hepatits, Disp: , Rfl:     predniSONE (DELTASONE) 5 MG tablet, Take 1 tablet (5 mg total) by mouth once daily., Disp: 90 tablet, Rfl: 1    diclofenac sodium (VOLTAREN) 1 % Gel, Apply 1 g topically 4 (four) times daily., Disp: 100 g, Rfl: 2    LORazepam (ATIVAN) 1 MG tablet, Take 0.5 tablets (0.5 mg total) by mouth every 12 (twelve) hours as needed for Anxiety (or restlessness). Can increase to a whole tablet if needed., Disp: 30 tablet, Rfl: 0      Objective:      BP (!) 108/59 (BP Location: Right arm, Patient Position: Sitting)   Pulse 67   Ht 5' 5" (1.651 m)   Wt 64.4 kg (141 lb 15.6 oz)   BMI 23.63 kg/m²   Physical Exam  Vitals and nursing note reviewed.   Constitutional:       General: She is not in acute distress.     Appearance: Normal appearance. She is normal weight. She is not ill-appearing, toxic-appearing or diaphoretic.   HENT:      Head: Normocephalic and atraumatic.      Right Ear: External ear normal.      Left Ear: External ear normal.      Nose: Nose normal. No congestion.      Mouth/Throat:      Mouth: Mucous membranes are moist.      Pharynx: " Oropharynx is clear. No oropharyngeal exudate or posterior oropharyngeal erythema.   Eyes:      General: No scleral icterus.        Right eye: No discharge.         Left eye: No discharge.      Extraocular Movements: Extraocular movements intact.      Conjunctiva/sclera: Conjunctivae normal.      Pupils: Pupils are equal, round, and reactive to light.   Neck:      Vascular: No carotid bruit.     Cardiovascular:      Rate and Rhythm: Normal rate and regular rhythm.      Pulses: Normal pulses.      Heart sounds: Normal heart sounds. No murmur heard.  Pulmonary:      Effort: Pulmonary effort is normal.      Breath sounds: Normal breath sounds. No wheezing or rales.   Abdominal:      General: Abdomen is flat. There is no distension.   Musculoskeletal:         General: Tenderness present.      Cervical back: Neck supple. Tenderness present. No rigidity.        Back:       Right lower leg: No edema.      Left lower leg: No edema.   Lymphadenopathy:      Cervical: No cervical adenopathy.   Skin:     General: Skin is warm and dry.      Capillary Refill: Capillary refill takes less than 2 seconds.      Coloration: Skin is not jaundiced or pale.   Neurological:      General: No focal deficit present.      Mental Status: She is alert and oriented to person, place, and time. Mental status is at baseline.      Motor: No weakness.   Psychiatric:         Attention and Perception: Attention normal.         Mood and Affect: Mood is anxious. Affect is tearful.         Speech: Speech normal. Speech is not rapid and pressured.         Behavior: Behavior normal. Behavior is cooperative.         Thought Content: Thought content normal. Thought content is not paranoid or delusional. Thought content does not include homicidal or suicidal ideation. Thought content does not include homicidal or suicidal plan.         Cognition and Memory: Cognition and memory normal.         Judgment: Judgment normal.         Assessment:       1. Pain of  "right hand    2. Anxiety    3. Cervical radiculopathy    4. CKD (chronic kidney disease) stage 2, GFR 60-89 ml/min        Plan:       Pain of right hand  -     diclofenac sodium (VOLTAREN) 1 % Gel; Apply 1 g topically 4 (four) times daily.  Dispense: 100 g; Refill: 2    Anxiety  -     LORazepam (ATIVAN) 1 MG tablet; Take 0.5 tablets (0.5 mg total) by mouth every 12 (twelve) hours as needed for Anxiety (or restlessness). Can increase to a whole tablet if needed.  Dispense: 30 tablet; Refill: 0    Cervical radiculopathy  -     X-Ray Cervical Spine AP And Lateral; Future; Expected date: 11/21/2023    CKD (chronic kidney disease) stage 2, GFR 60-89 ml/min  -     Basic Metabolic Panel; Future; Expected date: 12/25/2023  -     Urinalysis, Reflex to Urine Culture Urine, Clean Catch; Future; Expected date: 12/25/2023    Try the Voltaren gel topical.   Await XR results. Concerned about pinched nerve--could be due to muscle or could be due to disc problem.  Pt desires conservative mgmt for now.  Recommend changing pillow, also support back when side sleeping--search for body pillow online, shaped like a "U", has support for "front and back" when sleeping on side. This will take away some of the stress from the paraspinal muscles.    Change to ergonomic mouse.    Break frequently when working at computer.    Recommend PT. Patient will wait for now.          Previous records in Epic were reviewed, including the last 3 months of encounters, imaging, laboratory, and pathology reports.    Strict return precautions reviewed and patient verbalized understanding. Risks, benefits, and alternatives to the plan were reviewed in detail and all questions answered to the patient's satisfaction. Patient agrees to return to clinic or ER if symptoms worsen. 30 minutes total were spent on today's visit, not limited to but including time based on counseling and coordination of care.    Patient instructed that best way to communicate with my " office staff is for patient to get on the [facility] patient portal to expedite communication and communication issues that may occur.  Patient was given instructions on how to get on the portal.  I encouraged patient to obtain portal access as well.  Ultimately it is up to the patient to obtain access.  Patient voiced understanding.    This note was created using [employer] voice recognition software that occasionally may misinterpret phrases or words.    Follow up if symptoms worsen or fail to improve, for pending imaging reuslts.

## 2023-11-29 ENCOUNTER — HOSPITAL ENCOUNTER (OUTPATIENT)
Dept: RADIOLOGY | Facility: HOSPITAL | Age: 58
Discharge: HOME | End: 2023-11-29
Attending: FAMILY MEDICINE
Payer: COMMERCIAL

## 2023-11-29 DIAGNOSIS — M54.12 CERVICAL RADICULOPATHY: ICD-10-CM

## 2023-11-29 PROCEDURE — 72040 XR CERVICAL SPINE AP LATERAL: ICD-10-PCS | Mod: 26,,, | Performed by: RADIOLOGY

## 2023-11-29 PROCEDURE — 72040 X-RAY EXAM NECK SPINE 2-3 VW: CPT | Mod: TC

## 2023-11-29 PROCEDURE — 72040 X-RAY EXAM NECK SPINE 2-3 VW: CPT | Mod: 26,,, | Performed by: RADIOLOGY

## 2023-11-30 DIAGNOSIS — M54.12 CERVICAL RADICULOPATHY: Primary | ICD-10-CM

## 2023-12-08 ENCOUNTER — TELEPHONE (OUTPATIENT)
Dept: HEPATOLOGY | Facility: CLINIC | Age: 58
End: 2023-12-08
Payer: COMMERCIAL

## 2023-12-08 NOTE — TELEPHONE ENCOUNTER
----- Message from Elio Ramon MA sent at 12/7/2023  3:36 PM CST -----  Regarding: FW: Referral    ----- Message -----  From: Zuleyka Wellington RN  Sent: 12/7/2023   2:48 PM CST  To: [facility] Staff  Subject: FW: Referral                                     Last seen by Dr Swift 9/15/22 [facility]. Requesting Dr Jorgensen.  Thanks    ----- Message -----  From: Betzaida Hallman  Sent: 12/7/2023  11:29 AM CST  To: [facility] Clinical Staff  Subject: FW: Referral                                     Pt needs f/u appt phoebe  ----- Message -----  From: Cleopatra Smith  Sent: 12/7/2023  11:26 AM CST  To: [facility] Liver Referral Pool  Subject: Referral                                         Good morning,    I received a referral on behalf of the patient attached, from  Bernardo Patterson, with a diagnosis of Elevated ALT measurement and requesting pt be seen with Dr Jorgensen.  The patient is established with Hep.  I have scanned the referral and notes into media mgr.  Please review and contact the patient to schedule or to advise.     Thank you,       Cleopatra Smith  [facility]

## 2023-12-08 NOTE — TELEPHONE ENCOUNTER
Returned call to patient. She was last seen by Dr. Switf in [place] but she requested to be seen by Dr. Jorgensen in main [place]. Scheduled appointment in April and mailed appointment reminder to patient.

## 2024-02-15 ENCOUNTER — PATIENT MESSAGE (OUTPATIENT)
Dept: FAMILY MEDICINE | Facility: CLINIC | Age: 59
End: 2024-02-15
Payer: COMMERCIAL

## 2024-02-15 DIAGNOSIS — Z13.29 SCREENING FOR THYROID DISORDER: Primary | ICD-10-CM

## 2024-02-15 DIAGNOSIS — R79.89 LOW VITAMIN D LEVEL: ICD-10-CM

## 2024-02-15 DIAGNOSIS — K75.4 AUTOIMMUNE HEPATITIS: ICD-10-CM

## 2024-02-15 DIAGNOSIS — Z13.1 SCREENING FOR DIABETES MELLITUS: ICD-10-CM

## 2024-02-15 DIAGNOSIS — Z13.220 SCREENING, LIPID: ICD-10-CM

## 2024-02-15 DIAGNOSIS — E53.8 LOW SERUM VITAMIN B12: ICD-10-CM

## 2024-02-20 ENCOUNTER — TELEPHONE (OUTPATIENT)
Dept: FAMILY MEDICINE | Facility: CLINIC | Age: 59
End: 2024-02-20
Payer: COMMERCIAL

## 2024-02-20 DIAGNOSIS — N18.2 CKD (CHRONIC KIDNEY DISEASE) STAGE 2, GFR 60-89 ML/MIN: ICD-10-CM

## 2024-02-20 DIAGNOSIS — K75.4 AUTOIMMUNE HEPATITIS: Primary | ICD-10-CM

## 2024-02-20 NOTE — TELEPHONE ENCOUNTER
D/c the CMP and schedule the hepatic and renal panels in its place.    Also schedule the previously ordered vit D, b12, tsh, cbc, lipid, A1c, urinalysis with reflex C&S.

## 2024-02-20 NOTE — TELEPHONE ENCOUNTER
----- Message from Airam Moore sent at 2/20/2024 10:32 AM CST -----  Caller is requesting to schedule their Lab appointment prior to appointment.    Name of Caller:  Pt    Date of EPP Appointment:   2/27    [facility]    Would the patient rather a call back or a response via [facility]?   Call back    Best Call Back Number:   [phone]    Additional Information:  Pt is trying to get labs scheduled for this week before her appt on the 27th.   Please call back to advise. Thanks!

## 2024-02-22 ENCOUNTER — LAB VISIT (OUTPATIENT)
Dept: FAMILY MEDICINE | Facility: CLINIC | Age: 59
End: 2024-02-22
Payer: COMMERCIAL

## 2024-02-22 DIAGNOSIS — E53.8 LOW SERUM VITAMIN B12: ICD-10-CM

## 2024-02-22 DIAGNOSIS — Z13.29 SCREENING FOR THYROID DISORDER: ICD-10-CM

## 2024-02-22 DIAGNOSIS — K75.4 AUTOIMMUNE HEPATITIS: ICD-10-CM

## 2024-02-22 DIAGNOSIS — Z13.220 SCREENING, LIPID: ICD-10-CM

## 2024-02-22 DIAGNOSIS — Z13.1 SCREENING FOR DIABETES MELLITUS: ICD-10-CM

## 2024-02-22 DIAGNOSIS — N18.2 CKD (CHRONIC KIDNEY DISEASE) STAGE 2, GFR 60-89 ML/MIN: ICD-10-CM

## 2024-02-22 DIAGNOSIS — R79.89 LOW VITAMIN D LEVEL: ICD-10-CM

## 2024-02-22 LAB
ALBUMIN SERPL BCP-MCNC: 4.1 G/DL (ref 3.5–5.2)
ALBUMIN SERPL BCP-MCNC: 4.1 G/DL (ref 3.5–5.2)
ALP SERPL-CCNC: 50 U/L (ref 55–135)
ALT SERPL W/O P-5'-P-CCNC: 42 U/L (ref 10–44)
ANION GAP SERPL CALC-SCNC: 11 MMOL/L (ref 8–16)
ANION GAP SERPL CALC-SCNC: 11 MMOL/L (ref 8–16)
AST SERPL-CCNC: 30 U/L (ref 10–40)
BASOPHILS # BLD AUTO: 0.04 K/UL (ref 0–0.2)
BASOPHILS NFR BLD: 0.6 % (ref 0–1.9)
BILIRUB DIRECT SERPL-MCNC: 0.1 MG/DL (ref 0.1–0.3)
BILIRUB SERPL-MCNC: 0.4 MG/DL (ref 0.1–1)
BUN SERPL-MCNC: 20 MG/DL (ref 8–23)
BUN SERPL-MCNC: 20 MG/DL (ref 8–23)
CALCIUM SERPL-MCNC: 8.8 MG/DL (ref 8.7–10.5)
CALCIUM SERPL-MCNC: 8.8 MG/DL (ref 8.7–10.5)
CHLORIDE SERPL-SCNC: 106 MMOL/L (ref 95–110)
CHLORIDE SERPL-SCNC: 106 MMOL/L (ref 95–110)
CHOLEST SERPL-MCNC: 186 MG/DL (ref 120–199)
CHOLEST/HDLC SERPL: 3.1 {RATIO} (ref 2–5)
CO2 SERPL-SCNC: 25 MMOL/L (ref 23–29)
CO2 SERPL-SCNC: 25 MMOL/L (ref 23–29)
CREAT SERPL-MCNC: 0.8 MG/DL (ref 0.5–1.4)
CREAT SERPL-MCNC: 0.8 MG/DL (ref 0.5–1.4)
DIFFERENTIAL METHOD BLD: ABNORMAL
EOSINOPHIL # BLD AUTO: 0 K/UL (ref 0–0.5)
EOSINOPHIL NFR BLD: 0.6 % (ref 0–8)
ERYTHROCYTE [DISTWIDTH] IN BLOOD BY AUTOMATED COUNT: 14 % (ref 11.5–14.5)
EST. GFR  (NO RACE VARIABLE): >60 ML/MIN/1.73 M^2
EST. GFR  (NO RACE VARIABLE): >60 ML/MIN/1.73 M^2
ESTIMATED AVG GLUCOSE: 108 MG/DL (ref 68–131)
GLUCOSE SERPL-MCNC: 83 MG/DL (ref 70–110)
GLUCOSE SERPL-MCNC: 83 MG/DL (ref 70–110)
HBA1C MFR BLD: 5.4 % (ref 4–5.6)
HCT VFR BLD AUTO: 45.6 % (ref 37–48.5)
HDLC SERPL-MCNC: 60 MG/DL (ref 40–75)
HDLC SERPL: 32.3 % (ref 20–50)
HGB BLD-MCNC: 14.3 G/DL (ref 12–16)
IMM GRANULOCYTES # BLD AUTO: 0.02 K/UL (ref 0–0.04)
IMM GRANULOCYTES NFR BLD AUTO: 0.3 % (ref 0–0.5)
LDLC SERPL CALC-MCNC: 112.2 MG/DL (ref 63–159)
LYMPHOCYTES # BLD AUTO: 2 K/UL (ref 1–4.8)
LYMPHOCYTES NFR BLD: 27 % (ref 18–48)
MCH RBC QN AUTO: 30.4 PG (ref 27–31)
MCHC RBC AUTO-ENTMCNC: 31.4 G/DL (ref 32–36)
MCV RBC AUTO: 97 FL (ref 82–98)
MONOCYTES # BLD AUTO: 0.8 K/UL (ref 0.3–1)
MONOCYTES NFR BLD: 11.3 % (ref 4–15)
NEUTROPHILS # BLD AUTO: 4.4 K/UL (ref 1.8–7.7)
NEUTROPHILS NFR BLD: 60.2 % (ref 38–73)
NONHDLC SERPL-MCNC: 126 MG/DL
NRBC BLD-RTO: 0 /100 WBC
PHOSPHATE SERPL-MCNC: 2.8 MG/DL (ref 2.7–4.5)
PLATELET # BLD AUTO: 277 K/UL (ref 150–450)
PMV BLD AUTO: 9.4 FL (ref 9.2–12.9)
POTASSIUM SERPL-SCNC: 4.1 MMOL/L (ref 3.5–5.1)
POTASSIUM SERPL-SCNC: 4.1 MMOL/L (ref 3.5–5.1)
PROT SERPL-MCNC: 7.3 G/DL (ref 6–8.4)
RBC # BLD AUTO: 4.7 M/UL (ref 4–5.4)
SODIUM SERPL-SCNC: 142 MMOL/L (ref 136–145)
SODIUM SERPL-SCNC: 142 MMOL/L (ref 136–145)
TRIGL SERPL-MCNC: 69 MG/DL (ref 30–150)
TSH SERPL DL<=0.005 MIU/L-ACNC: 1.34 UIU/ML (ref 0.4–4)
WBC # BLD AUTO: 7.23 K/UL (ref 3.9–12.7)

## 2024-02-22 PROCEDURE — 82306 VITAMIN D 25 HYDROXY: CPT | Performed by: FAMILY MEDICINE

## 2024-02-22 PROCEDURE — 80069 RENAL FUNCTION PANEL: CPT | Performed by: FAMILY MEDICINE

## 2024-02-22 PROCEDURE — 84443 ASSAY THYROID STIM HORMONE: CPT | Performed by: FAMILY MEDICINE

## 2024-02-22 PROCEDURE — 84075 ASSAY ALKALINE PHOSPHATASE: CPT | Performed by: FAMILY MEDICINE

## 2024-02-22 PROCEDURE — 82607 VITAMIN B-12: CPT | Performed by: FAMILY MEDICINE

## 2024-02-22 PROCEDURE — 80061 LIPID PANEL: CPT | Performed by: FAMILY MEDICINE

## 2024-02-22 PROCEDURE — 83036 HEMOGLOBIN GLYCOSYLATED A1C: CPT | Performed by: FAMILY MEDICINE

## 2024-02-22 PROCEDURE — 85025 COMPLETE CBC W/AUTO DIFF WBC: CPT | Performed by: FAMILY MEDICINE

## 2024-02-23 LAB
25(OH)D3+25(OH)D2 SERPL-MCNC: 73 NG/ML (ref 30–96)
VIT B12 SERPL-MCNC: 428 PG/ML (ref 210–950)

## 2024-02-27 ENCOUNTER — OFFICE VISIT (OUTPATIENT)
Dept: FAMILY MEDICINE | Facility: CLINIC | Age: 59
End: 2024-02-27
Payer: COMMERCIAL

## 2024-02-27 VITALS
HEART RATE: 72 BPM | DIASTOLIC BLOOD PRESSURE: 70 MMHG | WEIGHT: 141 LBS | SYSTOLIC BLOOD PRESSURE: 108 MMHG | OXYGEN SATURATION: 97 % | HEIGHT: 65 IN | BODY MASS INDEX: 23.49 KG/M2

## 2024-02-27 DIAGNOSIS — M25.462 PAIN AND SWELLING OF LEFT KNEE: ICD-10-CM
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
## 2024-02-27 DIAGNOSIS — M25.562 PAIN AND SWELLING OF LEFT KNEE: ICD-10-CM

## 2024-02-27 DIAGNOSIS — H93.13 TINNITUS OF BOTH EARS: Primary | ICD-10-CM

## 2024-02-27 DIAGNOSIS — H81.12 BPPV (BENIGN PAROXYSMAL POSITIONAL VERTIGO), LEFT: ICD-10-CM

## 2024-02-27 PROCEDURE — 3074F SYST BP LT 130 MM HG: CPT | Mod: CPTII,S$GLB,, | Performed by: FAMILY MEDICINE

## 2024-02-27 PROCEDURE — 99396 PREV VISIT EST AGE 40-64: CPT | Mod: S$GLB,,, | Performed by: FAMILY MEDICINE

## 2024-02-27 PROCEDURE — 3008F BODY MASS INDEX DOCD: CPT | Mod: CPTII,S$GLB,, | Performed by: FAMILY MEDICINE

## 2024-02-27 PROCEDURE — 3078F DIAST BP <80 MM HG: CPT | Mod: CPTII,S$GLB,, | Performed by: FAMILY MEDICINE

## 2024-02-27 PROCEDURE — 99999 PR PBB SHADOW E&M-EST. PATIENT-LVL III: CPT | Mod: PBBFAC,,, | Performed by: FAMILY MEDICINE

## 2024-02-27 PROCEDURE — 1159F MED LIST DOCD IN RCRD: CPT | Mod: CPTII,S$GLB,, | Performed by: FAMILY MEDICINE

## 2024-02-27 PROCEDURE — 3044F HG A1C LEVEL LT 7.0%: CPT | Mod: CPTII,S$GLB,, | Performed by: FAMILY MEDICINE

## 2024-02-27 NOTE — PROGRESS NOTES
Subjective     Patient ID: William Hernandez is a 59 y.o. female.    Chief Complaint: Dizziness (Post covid in December 2023. Happens when changing positions), Tinnitus (Gets better when wearing hearing aids. Worse in L ear), and L knee pain (Starting PT in March)    PCP out of office follow up visit    Wellness labs review: PCP ordered labs patient here for review and form completed for work.    Review of systems positive for dizziness when she goes from laying down to standing. Not when sitting to standing. States she also has ringing in her ear. States she started to wear her hearing aids again to see if that will help with the ringing in her ears but it has not.  Patient does not follow with ear nose and throat nor has she had her hearing aids evaluated recently.    States her left knee swelled up without cause a week or so ago. Knee injection by urgent care seemed to help. Pt is now waiting for PT ([facility] in [place]) referred by urgent care.  Patient states they did x-rays there as well.            Review of Systems   Constitutional:  Negative for activity change, appetite change, chills, diaphoresis, fatigue, fever and unexpected weight change.   HENT:  Positive for hearing loss.    Respiratory:  Negative for cough, choking and chest tightness.    Cardiovascular:  Negative for chest pain, palpitations, leg swelling and claudication.   Gastrointestinal:  Negative for abdominal pain, change in bowel habit, constipation, diarrhea, nausea and vomiting.   Musculoskeletal:  Positive for arthralgias.   Neurological:  Positive for vertigo.   Psychiatric/Behavioral:  Negative for dysphoric mood, self-injury, sleep disturbance and suicidal ideas. The patient is not nervous/anxious.           Objective     Physical Exam  Vitals reviewed.   Constitutional:       General: She is not in acute distress.     Appearance: Normal appearance. She is not ill-appearing or toxic-appearing.   HENT:      Right Ear: Tympanic membrane  normal.      Left Ear: Tympanic membrane normal.   Cardiovascular:      Rate and Rhythm: Normal rate and regular rhythm.      Heart sounds: Normal heart sounds.   Pulmonary:      Effort: Pulmonary effort is normal.      Breath sounds: Normal breath sounds.   Musculoskeletal:      Right lower leg: No edema.      Left lower leg: No edema.   Neurological:      General: No focal deficit present.      Mental Status: She is alert and oriented to person, place, and time.      Comments: Positive Daisy-Hallpike on left   Psychiatric:         Mood and Affect: Mood normal.         Behavior: Behavior normal.            Assessment and Plan     1. Tinnitus of both ears    2. BPPV (benign paroxysmal positional vertigo), left    3. Pain and swelling of left knee    4. Injury caused by bending, initial encounter      Labs discussed with patient drawn by PCP.   Handout provided on Epley maneuvers patient to try at home.  Discussed also possible use of Antivert.  Patient to consider.    Patient states she will follow up with the [facility] that does her hearing aids and then see if she would like a referral to ear nose and throat.  Risks, benefits, and side effects were discussed with the patient. All questions were answered to the fullest satisfaction of the patient, and pt verbalized understanding and agreement to treatment plan. Pt was to call her PCP with any new or worsening symptoms, or present to the ER.  We will schedule follow up appointment with PCP to continue care       Mary Hernandez MD  Family Medicine Physician   [facility]     This note was created using M*Modal voice recognition software that occasionally may misinterpret phrases or words.

## 2024-04-04 DIAGNOSIS — M81.8 OSTEOPOROSIS DUE TO AROMATASE INHIBITOR: Primary | ICD-10-CM

## 2024-04-04 DIAGNOSIS — T38.6X5A OSTEOPOROSIS DUE TO AROMATASE INHIBITOR: Primary | ICD-10-CM

## 2024-04-04 NOTE — NURSING
Spoke with patient informed her that orders have been sent over to [facility], patient stated she would come into clinic tomorrow to sign a ROL for her previous bone density scan.

## 2024-04-04 NOTE — PROGRESS NOTES
Prolia Rx printed. Will need to fax to [facility], pt can then call to schedule her injection. Will need previous bone density reports. I cannot find in chart. Please update that information so that I can document necessity for Prolia.    Thanks,  Dr. Arroyo

## 2024-04-12 ENCOUNTER — PATIENT MESSAGE (OUTPATIENT)
Dept: FAMILY MEDICINE | Facility: CLINIC | Age: 59
End: 2024-04-12
Payer: COMMERCIAL

## 2024-04-19 ENCOUNTER — LAB VISIT (OUTPATIENT)
Dept: FAMILY MEDICINE | Facility: CLINIC | Age: 59
End: 2024-04-19
Payer: COMMERCIAL

## 2024-04-19 ENCOUNTER — PATIENT MESSAGE (OUTPATIENT)
Dept: FAMILY MEDICINE | Facility: CLINIC | Age: 59
End: 2024-04-19

## 2024-04-19 ENCOUNTER — E-VISIT (OUTPATIENT)
Dept: FAMILY MEDICINE | Facility: CLINIC | Age: 59
End: 2024-04-19
Payer: COMMERCIAL

## 2024-04-19 DIAGNOSIS — N76.1 CHRONIC VAGINITIS: Primary | ICD-10-CM

## 2024-04-19 DIAGNOSIS — K75.4 AUTOIMMUNE HEPATITIS: ICD-10-CM

## 2024-04-19 LAB
ALBUMIN SERPL BCP-MCNC: 3.8 G/DL (ref 3.5–5.2)
ALP SERPL-CCNC: 46 U/L (ref 55–135)
ALT SERPL W/O P-5'-P-CCNC: 47 U/L (ref 10–44)
ANION GAP SERPL CALC-SCNC: 9 MMOL/L (ref 8–16)
AST SERPL-CCNC: 32 U/L (ref 10–40)
BILIRUB SERPL-MCNC: 0.5 MG/DL (ref 0.1–1)
BUN SERPL-MCNC: 16 MG/DL (ref 8–23)
CALCIUM SERPL-MCNC: 8.9 MG/DL (ref 8.7–10.5)
CHLORIDE SERPL-SCNC: 107 MMOL/L (ref 95–110)
CO2 SERPL-SCNC: 25 MMOL/L (ref 23–29)
CREAT SERPL-MCNC: 0.8 MG/DL (ref 0.5–1.4)
EST. GFR  (NO RACE VARIABLE): >60 ML/MIN/1.73 M^2
GLUCOSE SERPL-MCNC: 85 MG/DL (ref 70–110)
POTASSIUM SERPL-SCNC: 4.3 MMOL/L (ref 3.5–5.1)
PROT SERPL-MCNC: 6.9 G/DL (ref 6–8.4)
SODIUM SERPL-SCNC: 141 MMOL/L (ref 136–145)

## 2024-04-19 PROCEDURE — 99421 OL DIG E/M SVC 5-10 MIN: CPT | Mod: ,,, | Performed by: FAMILY MEDICINE

## 2024-04-19 PROCEDURE — 80053 COMPREHEN METABOLIC PANEL: CPT | Performed by: FAMILY MEDICINE

## 2024-04-19 RX ORDER — FLUCONAZOLE 150 MG/1
150 TABLET ORAL
Qty: 2 TABLET | Refills: 0 | Status: SHIPPED | OUTPATIENT
Start: 2024-04-19 | End: 2024-04-25

## 2024-04-19 NOTE — TELEPHONE ENCOUNTER
Patient is getting prolia at [facility] in [place], [place] was denied, patient is requesting an appeal.

## 2024-04-19 NOTE — PROGRESS NOTES
Patient ID: William Hernandez is a 59 y.o. female.    Chief Complaint: Vaginal Discharge (Entered automatically based on patient selection in Patient Portal.)    The patient initiated a request through Ryan on 4/19/2024 for evaluation and management with a chief complaint of Vaginal Discharge (Entered automatically based on patient selection in Patient Portal.)     I evaluated the questionnaire submission on 04/19/2024 .    Ohs Peq Evisit Vaginal Discharge    4/19/2024  9:17 AM CDT - Filed by Patient   Do you agree to participate in an E-Visit? Yes   If you have any of the following symptoms,  please do not complete an E-Visit,  schedule an appointment with your provider: I acknowledge   What is the main issue you would like addressed today? What seems to be a chronic yeast  infection. Used Monistat 3 about 3 weeks ago. Symptoms came back 10 days later. Finished Clotrimazole  3 about 10 days ago and symptoms are coming back. Relief from symptoms while using the medication.   Are you able to take your vital signs? No   Which of the following are you experiencing? Vaginal Itching;  Vaginal discharge    Are you having pain while passing urine? No, I have no pain while urinating.   Which of the following applies to your vaginal discharge? I have a white/milky discharge.    Which of the following are you experiencing? Pain on intercourse   Do you have any sores on your genitals? No    Have you taken antibiotics recently? I have not been on any antibiotics    Do you use any of the following? None of the above   Which of the following applies to your menstrual period? I do not have menstrual periods   Have you had similar symptoms in the past? Yes, I have had had similar symptoms more than once before.   When you had similar symptoms in the past, did any of the following work? Cream for yeast infection   Have you had a fever? No   During the last 2 months, have you had sexual contact with a specific person for the first  time? No   Provide any additional information you feel is important.    Please attach any relevant images or files          Encounter Diagnosis   Name Primary?    Chronic vaginitis Yes        No orders of the defined types were placed in this encounter.     Medications Ordered This Encounter   Medications    fluconazole (DIFLUCAN) 150 MG Tab     Sig: Take 1 tablet (150 mg total) by mouth every 72 hours. for 6 days     Dispense:  2 tablet     Refill:  0    We will send Diflucan to the pharmacy we will advised patient to follow up but PCP for vaginosis screening if no improvement.    No follow-ups on file.      E-Visit Time Tracking:    Day 1 Time (in minutes): 5    Total Time (in minutes): 5

## 2024-04-22 ENCOUNTER — TELEPHONE (OUTPATIENT)
Dept: FAMILY MEDICINE | Facility: CLINIC | Age: 59
End: 2024-04-22
Payer: COMMERCIAL

## 2024-04-22 NOTE — TELEPHONE ENCOUNTER
----- Message from Ania Shanks sent at 4/22/2024  3:21 PM CDT -----  Contact: [facility] [phone]  Type: Needs Medical Advice  Who Called: [facility] rivera/ Ailyn Giron Call Back Number: [phone] fax [phone]  Additional Information: [facility] requesting a PA for pts Prolia. Pls call back and advise

## 2024-04-25 DIAGNOSIS — N76.1 CHRONIC VAGINITIS: Primary | ICD-10-CM

## 2024-04-26 ENCOUNTER — CLINICAL SUPPORT (OUTPATIENT)
Dept: FAMILY MEDICINE | Facility: CLINIC | Age: 59
End: 2024-04-26
Payer: COMMERCIAL

## 2024-04-26 DIAGNOSIS — N76.1 CHRONIC VAGINITIS: ICD-10-CM

## 2024-04-26 PROCEDURE — 81514 NFCT DS BV&VAGINITIS DNA ALG: CPT | Performed by: FAMILY MEDICINE

## 2024-04-27 LAB
BACTERIAL VAGINOSIS DNA: NEGATIVE
CANDIDA GLABRATA DNA: NEGATIVE
CANDIDA KRUSEI DNA: NEGATIVE
CANDIDA RRNA VAG QL PROBE: NEGATIVE
T VAGINALIS RRNA GENITAL QL PROBE: NEGATIVE

## 2024-04-30 ENCOUNTER — PATIENT OUTREACH (OUTPATIENT)
Dept: ADMINISTRATIVE | Facility: HOSPITAL | Age: 59
End: 2024-04-30
Payer: COMMERCIAL

## 2024-04-30 NOTE — PROGRESS NOTES
Population Health Chart Review & Patient Outreach Details      Additional [facility] Health Notes:               Updates Requested / Reviewed:      Updated Care Coordination Note, Care Everywhere, and Immunizations Reconciliation Completed or Queried: [facility] Topics Overdue:      [facility] Score: 0     Patient is not due for any topics at this time.    Influenza Vaccine  Pneumonia Vaccine  Tetanus Vaccine  Shingles/Zoster Vaccine  RSV Vaccine                  Health Maintenance Topic(s) Outreach Outcomes & Actions Taken:    Osteoporosis Screening - Outreach Outcomes & Actions Taken  : External Records Uploaded, Care Team Updated, & History Updated if Applicable

## 2024-05-03 ENCOUNTER — PATIENT MESSAGE (OUTPATIENT)
Dept: FAMILY MEDICINE | Facility: CLINIC | Age: 59
End: 2024-05-03
Payer: COMMERCIAL

## 2024-05-03 NOTE — TELEPHONE ENCOUNTER
Patients PA for Prolia was denied; denial letter stated that it is only cover for certain health condition, breast cancer being one of the conditions. Patient has history of breast cancer. Can you please review this

## 2024-05-06 ENCOUNTER — PATIENT MESSAGE (OUTPATIENT)
Dept: FAMILY MEDICINE | Facility: CLINIC | Age: 59
End: 2024-05-06
Payer: COMMERCIAL

## 2024-05-07 ENCOUNTER — OFFICE VISIT (OUTPATIENT)
Dept: OBSTETRICS AND GYNECOLOGY | Facility: CLINIC | Age: 59
End: 2024-05-07
Payer: COMMERCIAL

## 2024-05-07 VITALS
SYSTOLIC BLOOD PRESSURE: 149 MMHG | DIASTOLIC BLOOD PRESSURE: 67 MMHG | HEIGHT: 65 IN | BODY MASS INDEX: 23.66 KG/M2 | WEIGHT: 142 LBS

## 2024-05-07 DIAGNOSIS — N95.2 POST-MENOPAUSE ATROPHIC VAGINITIS: ICD-10-CM

## 2024-05-07 DIAGNOSIS — Z01.419 ENCOUNTER FOR ANNUAL ROUTINE GYNECOLOGICAL EXAMINATION: Primary | ICD-10-CM

## 2024-05-07 PROCEDURE — 99396 PREV VISIT EST AGE 40-64: CPT | Mod: S$GLB,,, | Performed by: OBSTETRICS & GYNECOLOGY

## 2024-05-07 PROCEDURE — 3044F HG A1C LEVEL LT 7.0%: CPT | Mod: CPTII,S$GLB,, | Performed by: OBSTETRICS & GYNECOLOGY

## 2024-05-07 PROCEDURE — 3008F BODY MASS INDEX DOCD: CPT | Mod: CPTII,S$GLB,, | Performed by: OBSTETRICS & GYNECOLOGY

## 2024-05-07 PROCEDURE — 3077F SYST BP >= 140 MM HG: CPT | Mod: CPTII,S$GLB,, | Performed by: OBSTETRICS & GYNECOLOGY

## 2024-05-07 PROCEDURE — 1159F MED LIST DOCD IN RCRD: CPT | Mod: CPTII,S$GLB,, | Performed by: OBSTETRICS & GYNECOLOGY

## 2024-05-07 PROCEDURE — 3078F DIAST BP <80 MM HG: CPT | Mod: CPTII,S$GLB,, | Performed by: OBSTETRICS & GYNECOLOGY

## 2024-05-07 PROCEDURE — 1160F RVW MEDS BY RX/DR IN RCRD: CPT | Mod: CPTII,S$GLB,, | Performed by: OBSTETRICS & GYNECOLOGY

## 2024-05-07 RX ORDER — ESTRADIOL 10 UG/1
10 INSERT VAGINAL
Qty: 24 TABLET | Refills: 3 | Status: SHIPPED | OUTPATIENT
Start: 2024-05-09 | End: 2025-05-09

## 2024-05-07 NOTE — PROGRESS NOTES
Annual gyn exam    HPI:  William Hernandez is a 59 y.o. female  presents for a well woman exam.  LMP: No LMP recorded. Patient is postmenopausal..  No issues, problems, or complaints.  She has a history of chronic atrophic vaginitis and has used dilators in the past also vaginal estradiol    Past Medical History:   Diagnosis Date    Abnormal uterine bleeding     Uterine ablation    Allergy     Autoimmune hepatitis     Breast cancer     Bulging lumbar disc     Cancer     Dyspareunia     Menopause, chemo, anti-hormone treatment    Osteoporosis     Personal history of colonic polyps 2023    PER dR [facility] NOTES, IN [place]    Urinary incontinence      Past Surgical History:   Procedure Laterality Date    ABDOMINAL SURGERY      , appendectomy    APPENDECTOMY      AUGMENTATION OF BREAST      Bilateral mastectomy    BILATERAL MASTECTOMY  2011    BREAST SURGERY  2011    Bilateral mastecomy     SECTION  2001    COLONOSCOPY W/ POLYPECTOMY N/A 2023    REPAT IN 5 YRS    masectomy      MASTECTOMY      TONSILLECTOMY      TUBAL LIGATION  2012    Estimated date     Social History     Socioeconomic History    Marital status:    Tobacco Use    Smoking status: Never     Passive exposure: Never    Smokeless tobacco: Never   Substance and Sexual Activity    Alcohol use: Not Currently    Drug use: Never    Sexual activity: Not Currently     Partners: Male     Birth control/protection: None     Social Determinants of Health     Financial Resource Strain: Low Risk  (2024)    Overall Financial Resource Strain (CARDIA)     Difficulty of Paying Living Expenses: Not hard at all   Food Insecurity: No Food Insecurity (2024)    Hunger Vital Sign     Worried About Running Out of Food in the Last Year: Never true     Ran Out of Food in the Last Year: Never true   Transportation Needs: No Transportation Needs (2024)    PRAPARE - Transportation     Lack of  "Transportation (Medical): No     Lack of Transportation (Non-Medical): No   Physical Activity: Sufficiently Active (2024)    Exercise Vital Sign     Days of Exercise per Week: 5 days     Minutes of Exercise per Session: 30 min   Stress: No Stress Concern Present (2024)    [place] [employer] - Occupational Stress Questionnaire     Feeling of Stress : Only a little   Housing Stability: Low Risk  (2024)    Housing Stability Vital Sign     Unable to Pay for Housing in the Last Year: No     Number of Places Lived in the Last Year: 1     Unstable Housing in the Last Year: No     Family History   Problem Relation Name Age of Onset    Irritable bowel syndrome Mother Taylor     Arthritis Mother Taylor     Depression Mother Taylor     Hearing loss Mother Taylor     Miscarriages / Stillbirths Mother Taylor     Osteoarthritis Mother Taylor     Migraines Mother Taylor     Heart disease Father Ahsan Storm     Stroke Father Ahsan Storm     Diabetes Paternal Grandmother Robbi Storm      OB History          1    Para   1    Term   1            AB        Living   1         SAB        IAB        Ectopic        Multiple        Live Births                     BP (!) 149/67   Ht 5' 5" (1.651 m)   Wt 64.4 kg (142 lb)   BMI 23.63 kg/m²     ROS:  GENERAL: Denies weight gain or weight loss. Feeling well overall.   SKIN: Denies rash or lesions.   HEAD: Denies head injury or headache.   NODES: Denies enlarged lymph nodes.   CHEST: Denies chest pain or shortness of breath.   CARDIOVASCULAR: Denies palpitations or left sided chest pain.   ABDOMEN: No abdominal pain, constipation, diarrhea, nausea, vomiting or rectal bleeding.   URINARY: No frequency, dysuria, hematuria, or burning on urination.  REPRODUCTIVE: See HPI.   BREASTS:  Status post bilateral mastectomy with reconstruction, no abnormal findings  HEMATOLOGIC: No easy bruisability or excessive bleeding. "   MUSCULOSKELETAL: Denies joint pain or swelling.   NEUROLOGIC: Denies syncope or weakness.   PSYCHIATRIC: Denies depression, anxiety or mood swings.    PHYSICAL EXAM:    APPEARANCE: Well nourished, well developed, in no acute distress.  AFFECT: WNL, alert and oriented x 3  SKIN: No acne or hirsutism  NECK: Neck symmetric without masses or thyromegaly  NODES: No inguinal, cervical, axillary, or femoral lymph node enlargement  CHEST: Good respiratory effect  ABDOMEN: Soft.  No tenderness or masses.  No hepatosplenomegaly.  No hernias.  BREASTS: Symmetrical, no skin changes or visible lesions.  No palpable masses, nipple discharge bilaterally.  PELVIC:      Pelvic  Vv atrophic mucosa, without discharge or lesions  Cervix clear  Uterus anteverted normal size  Adnexa normal, without masses        ICD-10-CM ICD-9-CM    1. Encounter for annual routine gynecological examination  Z01.419 V72.31       2. Post-menopause atrophic vaginitis  N95.2 627.3 estradioL (VAGIFEM) 10 mcg Tab        History breast cancer with bilateral mastectomy, no mammogram indicated  Colonoscopy up-to-date  Normal Pap 05/15/2023

## 2024-05-13 ENCOUNTER — TELEPHONE (OUTPATIENT)
Dept: FAMILY MEDICINE | Facility: CLINIC | Age: 59
End: 2024-05-13
Payer: COMMERCIAL

## 2024-05-13 DIAGNOSIS — T38.6X5A OSTEOPOROSIS DUE TO AROMATASE INHIBITOR: ICD-10-CM

## 2024-05-13 DIAGNOSIS — M81.8 OSTEOPOROSIS DUE TO AROMATASE INHIBITOR: ICD-10-CM

## 2024-05-13 NOTE — TELEPHONE ENCOUNTER
----- Message from Alis Jennifer sent at 5/13/2024  3:37 PM CDT -----  Type: Needs Medical Advice  Who Called:  Vicky from [facility] Call Back Number: [phone]  Additional Information: Vicky is calling the office to have a PA sent in for the Prolia and as soon as it is approved the pt will be scheduled. Please call back to advise. Thanks!

## 2024-05-16 ENCOUNTER — PATIENT MESSAGE (OUTPATIENT)
Dept: OBSTETRICS AND GYNECOLOGY | Facility: CLINIC | Age: 59
End: 2024-05-16
Payer: COMMERCIAL

## 2024-06-13 ENCOUNTER — TELEPHONE (OUTPATIENT)
Dept: HEPATOLOGY | Facility: CLINIC | Age: 59
End: 2024-06-13
Payer: COMMERCIAL

## 2024-06-13 ENCOUNTER — OFFICE VISIT (OUTPATIENT)
Dept: HEPATOLOGY | Facility: CLINIC | Age: 59
End: 2024-06-13
Attending: INTERNAL MEDICINE
Payer: COMMERCIAL

## 2024-06-13 ENCOUNTER — LAB VISIT (OUTPATIENT)
Dept: LAB | Facility: HOSPITAL | Age: 59
End: 2024-06-13
Attending: INTERNAL MEDICINE
Payer: COMMERCIAL

## 2024-06-13 VITALS
BODY MASS INDEX: 23.81 KG/M2 | TEMPERATURE: 98 F | SYSTOLIC BLOOD PRESSURE: 155 MMHG | OXYGEN SATURATION: 98 % | HEART RATE: 90 BPM | DIASTOLIC BLOOD PRESSURE: 77 MMHG | WEIGHT: 143.06 LBS

## 2024-06-13 DIAGNOSIS — K75.4 AUTOIMMUNE HEPATITIS: ICD-10-CM

## 2024-06-13 DIAGNOSIS — K75.4 AUTOIMMUNE HEPATITIS: Primary | ICD-10-CM

## 2024-06-13 LAB
ALBUMIN SERPL BCP-MCNC: 4 G/DL (ref 3.5–5.2)
ALP SERPL-CCNC: 53 U/L (ref 55–135)
ALT SERPL W/O P-5'-P-CCNC: 53 U/L (ref 10–44)
ANION GAP SERPL CALC-SCNC: 9 MMOL/L (ref 8–16)
AST SERPL-CCNC: 34 U/L (ref 10–40)
BILIRUB SERPL-MCNC: 0.4 MG/DL (ref 0.1–1)
BUN SERPL-MCNC: 17 MG/DL (ref 8–23)
CALCIUM SERPL-MCNC: 10 MG/DL (ref 8.7–10.5)
CHLORIDE SERPL-SCNC: 105 MMOL/L (ref 95–110)
CO2 SERPL-SCNC: 27 MMOL/L (ref 23–29)
CREAT SERPL-MCNC: 0.8 MG/DL (ref 0.5–1.4)
EST. GFR  (NO RACE VARIABLE): >60 ML/MIN/1.73 M^2
GLUCOSE SERPL-MCNC: 93 MG/DL (ref 70–110)
IGG SERPL-MCNC: 714 MG/DL (ref 650–1600)
POTASSIUM SERPL-SCNC: 4 MMOL/L (ref 3.5–5.1)
PROT SERPL-MCNC: 7.2 G/DL (ref 6–8.4)
SODIUM SERPL-SCNC: 141 MMOL/L (ref 136–145)

## 2024-06-13 PROCEDURE — 36415 COLL VENOUS BLD VENIPUNCTURE: CPT | Performed by: INTERNAL MEDICINE

## 2024-06-13 PROCEDURE — 3078F DIAST BP <80 MM HG: CPT | Mod: CPTII,S$GLB,, | Performed by: INTERNAL MEDICINE

## 2024-06-13 PROCEDURE — 80053 COMPREHEN METABOLIC PANEL: CPT | Performed by: INTERNAL MEDICINE

## 2024-06-13 PROCEDURE — 1159F MED LIST DOCD IN RCRD: CPT | Mod: CPTII,S$GLB,, | Performed by: INTERNAL MEDICINE

## 2024-06-13 PROCEDURE — 99999 PR PBB SHADOW E&M-EST. PATIENT-LVL III: CPT | Mod: PBBFAC,,, | Performed by: INTERNAL MEDICINE

## 2024-06-13 PROCEDURE — 3044F HG A1C LEVEL LT 7.0%: CPT | Mod: CPTII,S$GLB,, | Performed by: INTERNAL MEDICINE

## 2024-06-13 PROCEDURE — 99214 OFFICE O/P EST MOD 30 MIN: CPT | Mod: S$GLB,,, | Performed by: INTERNAL MEDICINE

## 2024-06-13 PROCEDURE — 82784 ASSAY IGA/IGD/IGG/IGM EACH: CPT | Performed by: INTERNAL MEDICINE

## 2024-06-13 PROCEDURE — 86038 ANTINUCLEAR ANTIBODIES: CPT | Performed by: INTERNAL MEDICINE

## 2024-06-13 PROCEDURE — 1160F RVW MEDS BY RX/DR IN RCRD: CPT | Mod: CPTII,S$GLB,, | Performed by: INTERNAL MEDICINE

## 2024-06-13 PROCEDURE — 3077F SYST BP >= 140 MM HG: CPT | Mod: CPTII,S$GLB,, | Performed by: INTERNAL MEDICINE

## 2024-06-13 PROCEDURE — 3008F BODY MASS INDEX DOCD: CPT | Mod: CPTII,S$GLB,, | Performed by: INTERNAL MEDICINE

## 2024-06-13 NOTE — TELEPHONE ENCOUNTER
----- Message from Wendy Peraza sent at 6/13/2024 10:25 AM CDT -----  Regarding: Pt running late to appt:  Contact: Pt  [phone]        Provider:   Dayne Jorgensen MD  Caller:  William Cabellot time:   10:30   ETA:    10:40  Asking, will still be seen? Please call [phone] if rescheduling is needed

## 2024-06-13 NOTE — TELEPHONE ENCOUNTER
I called the pt and she stated that she is in the elevator.She just want to know if she can be seen even she is late.I informed her she can still be seen.

## 2024-06-14 LAB — ANA SER QL IF: NORMAL

## 2024-07-01 NOTE — PROGRESS NOTES
HEPATOLOGY CONSULTATION    Referring Physician: Dr. Bernardo Patterson  Current Corresponding Physician: Dr. Ada Tran    Reason for Consultation: Consultation for evaluation of Autoimmune hepatitis    History of Present Illness: William Hrenandez is a 59 y.o. femalewho presents for evaluation of   Chief Complaint   Patient presents with    Autoimmune hepatitis     This 59-year-old woman was referred for evaluation and management of an established diagnosis of autoimmune hepatitis.  She was diagnosed with autoimmune hepatitis around 2005.  She had a biopsy that showed piecemeal necrosis and she had a serological profile consistent with autoimmune hepatitis.  A remission was induced with prednisone and then she was transitioned to azathioprine.  However she did not tolerate azathioprine due to significant leukopenia.  On several occasions she has tried to taper the prednisone.  It seems like when she gets below 3 mg daily she flares.  A recent biopsy in November of 2023 showed a histological remission and no evidence of fibrosis.  She has currently in a biochemical remission on 3 mg a day of prednisone.  She has no symptoms of chronic liver disease.  She does have metabolic bone disease and is on appropriate treatment.  Past Medical History:   Diagnosis Date    Abnormal uterine bleeding 2003    Uterine ablation    Allergy     Autoimmune hepatitis     Breast cancer     Bulging lumbar disc     Cancer     Dyspareunia 2011    Menopause, chemo, anti-hormone treatment    Osteoporosis     Personal history of colonic polyps 04/28/2023    PER dR [facility] NOTES, [place]    Urinary incontinence 2022     Outpatient Encounter Medications as of 6/13/2024   Medication Sig Dispense Refill    calcium carbonate 1250 MG capsule Take 1,250 mg by mouth.      calcium citrate-vitamin D3 315-200 mg (CITRACAL+D) 315 mg-5 mcg (200 unit) per tablet Take 1 tablet by mouth 2 (two) times daily.      cholecalciferol, vitamin D3, (VITAMIN D3) 25 mcg (1,000  unit) capsule Take 2,000 Units by mouth.      denosumab (PROLIA) 60 mg/mL Syrg Inject 1 mL (60 mg total) into the skin every 6 (six) months. 1 mL 1    diclofenac sodium (VOLTAREN) 1 % Gel Apply 1 g topically 4 (four) times daily. 100 g 2    estradioL (VAGIFEM) 10 mcg Tab Place 1 tablet (10 mcg total) vaginally twice a week. 24 tablet 3    loratadine 10 mg Cap       predniSONE (DELTASONE) 1 MG tablet Take 3 mg by mouth once daily. 1 mg x3 by mouth daily Auto immune Hepatits      LORazepam (ATIVAN) 1 MG tablet Take 0.5 tablets (0.5 mg total) by mouth every 12 (twelve) hours as needed for Anxiety (or restlessness). Can increase to a whole tablet if needed. 30 tablet 0     No facility-administered encounter medications on file as of 6/13/2024.     Review of patient's allergies indicates:   Allergen Reactions    Amoxicillin Rash    Cefuroxime axetil Rash    Cephalexin      Other reaction(s): GI Intolerance  UPSET STOMACH  Other reaction(s): GI Intolerance  UPSET STOMACH       Family History   Problem Relation Name Age of Onset    Irritable bowel syndrome Mother Taylor     Arthritis Mother Taylor     Depression Mother Taylor     Hearing loss Mother Taylor     Miscarriages / Stillbirths Mother Taylor     Osteoarthritis Mother Taylor     Migraines Mother Taylor     Heart disease Father Ahsan Storm     Stroke Father Ahsan Storm     Diabetes Paternal Grandmother Robbi Storm        Social History     Socioeconomic History    Marital status:    Tobacco Use    Smoking status: Never     Passive exposure: Never    Smokeless tobacco: Never   Substance and Sexual Activity    Alcohol use: Not Currently    Drug use: Never    Sexual activity: Not Currently     Partners: Male     Birth control/protection: None     Social Determinants of Health     Financial Resource Strain: Low Risk  (2/22/2024)    Overall Financial Resource Strain (CARDIA)     Difficulty of Paying Living Expenses: Not hard at all   Food  Insecurity: No Food Insecurity (2/22/2024)    Hunger Vital Sign     Worried About Running Out of Food in the Last Year: Never true     Ran Out of Food in the Last Year: Never true   Transportation Needs: No Transportation Needs (2/22/2024)    PRAPARE - Transportation     Lack of Transportation (Medical): No     Lack of Transportation (Non-Medical): No   Physical Activity: Sufficiently Active (2/22/2024)    Exercise Vital Sign     Days of Exercise per Week: 5 days     Minutes of Exercise per Session: 30 min   Stress: No Stress Concern Present (2/22/2024)    [employer]izen [employer] [place] [employer] - Occupational Stress Questionnaire     Feeling of Stress : Only a little   Housing Stability: Low Risk  (2/22/2024)    Housing Stability Vital Sign     Unable to Pay for Housing in the Last Year: No     Number of Places Lived in the Last Year: 1     Unstable Housing in the Last Year: No     Review of Systems   Constitutional:  Negative for appetite change, fatigue and unexpected weight change.   HENT:  Negative for ear pain, hearing loss, sore throat and trouble swallowing.    Eyes:  Negative for visual disturbance.   Respiratory:  Negative for cough and shortness of breath.    Cardiovascular:  Negative for chest pain and palpitations.   Gastrointestinal:  Negative for abdominal pain, nausea and vomiting.   Genitourinary:  Negative for difficulty urinating and dysuria.   Musculoskeletal:  Negative for arthralgias and back pain.   Skin:  Negative for rash.   Neurological:  Negative for tremors, seizures and headaches.   Psychiatric/Behavioral:  Negative for agitation and decreased concentration.      Vitals:    06/13/24 1057   BP: (!) 155/77   Pulse: 90   Temp: 98 °F (36.7 °C)       Physical Exam  Constitutional:       Appearance: She is well-developed. She is not diaphoretic.   HENT:      Right Ear: External ear normal.      Left Ear: External ear normal.   Eyes:      General: No scleral icterus.  Cardiovascular:      Rate  and Rhythm: Normal rate and regular rhythm.      Heart sounds: Normal heart sounds. No murmur heard.     No friction rub. No gallop.   Pulmonary:      Effort: Pulmonary effort is normal. No respiratory distress.      Breath sounds: Normal breath sounds. No wheezing.   Abdominal:      General: Bowel sounds are normal. There is no distension.      Palpations: Abdomen is soft. There is no mass.      Tenderness: There is no abdominal tenderness.   Musculoskeletal:         General: Normal range of motion.   Lymphadenopathy:      Cervical: No cervical adenopathy.   Skin:     General: Skin is warm and dry.      Coloration: Skin is not pale.   Neurological:      Mental Status: She is alert and oriented to person, place, and time.         Computed MELD 3.0 unavailable. One or more values for this score either were not found within the given timeframe or did not fit some other criterion.  Computed MELD-Na unavailable. One or more values for this score either were not found within the given timeframe or did not fit some other criterion.      Lab Results   Component Value Date    GLU 93 06/13/2024    BUN 17 06/13/2024    CREATININE 0.8 06/13/2024    CALCIUM 10.0 06/13/2024     06/13/2024    K 4.0 06/13/2024     06/13/2024    PROT 7.2 06/13/2024    CO2 27 06/13/2024    ANIONGAP 9 06/13/2024    WBC 7.23 02/22/2024    RBC 4.70 02/22/2024    HGB 14.3 02/22/2024    HCT 45.6 02/22/2024    MCV 97 02/22/2024    MCH 30.4 02/22/2024    MCHC 31.4 (L) 02/22/2024     Lab Results   Component Value Date    RDW 14.0 02/22/2024     02/22/2024    MPV 9.4 02/22/2024    GRAN 4.4 02/22/2024    GRAN 60.2 02/22/2024    LYMPH 2.0 02/22/2024    LYMPH 27.0 02/22/2024    MONO 0.8 02/22/2024    MONO 11.3 02/22/2024    EOSINOPHIL 0.6 02/22/2024    BASOPHIL 0.6 02/22/2024    EOS 0.0 02/22/2024    BASO 0.04 02/22/2024    CHOL 186 02/22/2024    TRIG 69 02/22/2024    HDL 60 02/22/2024    CHOLHDL 32.3 02/22/2024    TOTALCHOLEST 3.1 02/22/2024     ALBUMIN 4.0 06/13/2024    BILIDIR 0.1 02/22/2024    AST 34 06/13/2024    ALT 53 (H) 06/13/2024    ALKPHOS 53 (L) 06/13/2024    MG 2.2 10/14/2022    LABPROT 11.3 04/04/2023    INR 1.1 04/04/2023       Assessment and Plan:  Patient Active Problem List   Diagnosis    Osteoporosis due to aromatase inhibitor    Autoimmune hepatitis    History of breast cancer    Vaginal dryness    Dyspareunia due to medical condition in female    Current chronic use of systemic steroids    Diabetes due to underlying condition w oth complication    Breast cancer, right    Hyperglycemia    Macrocytosis without anemia    Grief    Fatigue    Anxiety    Low serum vitamin B12    Sleep disorder with mood complaints     William Hernandez is a 59 y.o. female withAutoimmune hepatitis    Impression:  Autoimmune hepatitis in remission on maintenance prednisone at 3 mg daily.    Plan:  We did discuss alternatives to prednisone such as budesonide.  We also discussed other maintenance options such as tacrolimus or CellCept.  I do not think she would tolerate CellCept due to her previous leukopenia on azathioprine.  I think it may be high-risk to get her off prednisone given the amount of time she has been on prednisone.  Thus, after discussion we agreed to not make a switch at this time.  I will see her again for follow-up in 6 months' time.

## 2024-07-09 ENCOUNTER — PATIENT MESSAGE (OUTPATIENT)
Dept: FAMILY MEDICINE | Facility: CLINIC | Age: 59
End: 2024-07-09
Payer: COMMERCIAL

## 2024-07-09 DIAGNOSIS — K75.4 AUTOIMMUNE HEPATITIS: Primary | ICD-10-CM

## 2024-07-18 ENCOUNTER — LAB VISIT (OUTPATIENT)
Dept: LAB | Facility: HOSPITAL | Age: 59
End: 2024-07-18
Attending: FAMILY MEDICINE
Payer: COMMERCIAL

## 2024-07-18 DIAGNOSIS — K75.4 AUTOIMMUNE HEPATITIS: ICD-10-CM

## 2024-07-18 LAB
ALBUMIN SERPL BCP-MCNC: 3.8 G/DL (ref 3.5–5.2)
ALP SERPL-CCNC: 42 U/L (ref 55–135)
ALT SERPL W/O P-5'-P-CCNC: 52 U/L (ref 10–44)
AST SERPL-CCNC: 34 U/L (ref 10–40)
BILIRUB DIRECT SERPL-MCNC: 0.2 MG/DL (ref 0.1–0.3)
BILIRUB SERPL-MCNC: 0.5 MG/DL (ref 0.1–1)
PROT SERPL-MCNC: 6.8 G/DL (ref 6–8.4)

## 2024-07-18 PROCEDURE — 36415 COLL VENOUS BLD VENIPUNCTURE: CPT | Performed by: FAMILY MEDICINE

## 2024-07-18 PROCEDURE — 80076 HEPATIC FUNCTION PANEL: CPT | Performed by: FAMILY MEDICINE

## 2024-07-18 PROCEDURE — 82977 ASSAY OF GGT: CPT | Performed by: FAMILY MEDICINE

## 2024-07-19 LAB — GGT SERPL-CCNC: 41 U/L (ref 8–55)

## 2024-07-22 ENCOUNTER — OFFICE VISIT (OUTPATIENT)
Dept: FAMILY MEDICINE | Facility: CLINIC | Age: 59
End: 2024-07-22
Payer: COMMERCIAL

## 2024-07-22 VITALS
SYSTOLIC BLOOD PRESSURE: 116 MMHG | OXYGEN SATURATION: 96 % | WEIGHT: 146.63 LBS | BODY MASS INDEX: 24.43 KG/M2 | DIASTOLIC BLOOD PRESSURE: 60 MMHG | HEIGHT: 65 IN | HEART RATE: 94 BPM

## 2024-07-22 DIAGNOSIS — M81.8 OSTEOPOROSIS DUE TO AROMATASE INHIBITOR: ICD-10-CM

## 2024-07-22 DIAGNOSIS — M25.462 PAIN AND SWELLING OF LEFT KNEE: ICD-10-CM

## 2024-07-22 DIAGNOSIS — Z00.00 ENCOUNTER FOR PREVENTIVE CARE: ICD-10-CM

## 2024-07-22 DIAGNOSIS — K75.4 AUTOIMMUNE HEPATITIS: ICD-10-CM

## 2024-07-22 DIAGNOSIS — M25.562 PAIN AND SWELLING OF LEFT KNEE: ICD-10-CM

## 2024-07-22 DIAGNOSIS — Z85.3 HISTORY OF BREAST CANCER: ICD-10-CM

## 2024-07-22 DIAGNOSIS — H93.13 TINNITUS OF BOTH EARS: Primary | ICD-10-CM

## 2024-07-22 DIAGNOSIS — Z79.52 CURRENT CHRONIC USE OF SYSTEMIC STEROIDS: ICD-10-CM

## 2024-07-22 DIAGNOSIS — T38.6X5A OSTEOPOROSIS DUE TO AROMATASE INHIBITOR: ICD-10-CM

## 2024-07-22 DIAGNOSIS — H91.93 BILATERAL HEARING LOSS, UNSPECIFIED HEARING LOSS TYPE: ICD-10-CM

## 2024-07-22 DIAGNOSIS — H81.12 BPPV (BENIGN PAROXYSMAL POSITIONAL VERTIGO), LEFT: ICD-10-CM

## 2024-07-22 DIAGNOSIS — N89.8 VAGINAL DRYNESS: ICD-10-CM

## 2024-07-22 DIAGNOSIS — R73.03 PREDIABETES: ICD-10-CM

## 2024-07-22 DIAGNOSIS — Z79.60 LONG-TERM USE OF IMMUNOSUPPRESSANT MEDICATION: ICD-10-CM

## 2024-07-22 DIAGNOSIS — N39.46 MIXED STRESS AND URGE URINARY INCONTINENCE: ICD-10-CM

## 2024-07-22 DIAGNOSIS — N94.19 DYSPAREUNIA DUE TO MEDICAL CONDITION IN FEMALE: ICD-10-CM

## 2024-07-22 PROCEDURE — 1160F RVW MEDS BY RX/DR IN RCRD: CPT | Mod: CPTII,S$GLB,, | Performed by: FAMILY MEDICINE

## 2024-07-22 PROCEDURE — 1159F MED LIST DOCD IN RCRD: CPT | Mod: CPTII,S$GLB,, | Performed by: FAMILY MEDICINE

## 2024-07-22 PROCEDURE — 3078F DIAST BP <80 MM HG: CPT | Mod: CPTII,S$GLB,, | Performed by: FAMILY MEDICINE

## 2024-07-22 PROCEDURE — 3044F HG A1C LEVEL LT 7.0%: CPT | Mod: CPTII,S$GLB,, | Performed by: FAMILY MEDICINE

## 2024-07-22 PROCEDURE — 99215 OFFICE O/P EST HI 40 MIN: CPT | Mod: S$GLB,,, | Performed by: FAMILY MEDICINE

## 2024-07-22 PROCEDURE — 3008F BODY MASS INDEX DOCD: CPT | Mod: CPTII,S$GLB,, | Performed by: FAMILY MEDICINE

## 2024-07-22 PROCEDURE — 3074F SYST BP LT 130 MM HG: CPT | Mod: CPTII,S$GLB,, | Performed by: FAMILY MEDICINE

## 2024-07-22 PROCEDURE — G2211 COMPLEX E/M VISIT ADD ON: HCPCS | Mod: S$GLB,,, | Performed by: FAMILY MEDICINE

## 2024-07-22 PROCEDURE — 99999 PR PBB SHADOW E&M-EST. PATIENT-LVL V: CPT | Mod: PBBFAC,,, | Performed by: FAMILY MEDICINE

## 2024-07-22 NOTE — Clinical Note
Good morning, I've placed ENT and audiology referral; I would rather her be able to see someone with [facility], but she wants to go to [place] ([facility], but not sure if he has audiology in the [facility] clinic.) She will switch to Dr. Buck and his audiologist if [facility] doesn't have audiology in office. AM

## 2024-07-22 NOTE — PROGRESS NOTES
Subjective:       Patient ID: William Hernandez is a 59 y.o. female.    Chief Complaint: Follow-up (Pt is here for a follow up on her knee, having fatigue, vertigo )    Osteoporosis--she has been taking Prolia since fall of 2021, currently getting injections q6 mo at [facility]. Due for DXA, last one in 2021.    Autoimmune hepatitis--currently on predinisone 3mg daily. Saw hepatology earlier this year but will follow up with GI (Dr. Patterson.) no med change recommended at consult.    Hx of breast cancer, but was triple positive and she had consult with oncology in order to continue estrogen vaginally for atrophic vaginitis symptoms. Reports never heard from pelvic floor PT, requests referral (noted in chart referral was faxed, pt concerned previous PT might have a long wait.) has dysparunia as well as mixed urinary incontinence.    Tinnitus worse, bialteral. Wears headphones for work, reports she does not usually wear her hearing aids. Feels vertigo happening intermittently, was improved at some point but now (the last few weeks) with recurrence of symptoms. Would like to see ENT and audiology.              2/27/2024     8:50 AM 10/10/2022    12:02 PM   Depression Patient Health Questionnaire   Over the last two weeks how often have you been bothered by little interest or pleasure in doing things Not at all Not at all   Over the last two weeks how often have you been bothered by feeling down, depressed or hopeless Not at all Not at all   PHQ-2 Total Score 0 0          No data to display                Review of Systems   Constitutional:  Positive for fatigue.   Genitourinary:  Positive for dyspareunia and urgency.   Musculoskeletal:  Positive for arthralgias.   All other systems reviewed and are negative.        Past Medical History:   Diagnosis Date    Abnormal uterine bleeding 2003    Uterine ablation    Allergy     Autoimmune hepatitis     Breast cancer     Bulging lumbar disc     Cancer     Dyspareunia 2011     Menopause, chemo, anti-hormone treatment    Osteoporosis     Personal history of colonic polyps 2023    PER [facility]ES, IN [place]    Urinary incontinence      Past Surgical History:   Procedure Laterality Date    ABDOMINAL SURGERY      , appendectomy    APPENDECTOMY      AUGMENTATION OF BREAST  2012    Bilateral mastectomy    BILATERAL MASTECTOMY  2011    BREAST SURGERY  2011    Bilateral mastecomy     SECTION  2001    COLONOSCOPY W/ POLYPECTOMY N/A 2023    REPAT IN 5 YRS    masectomy      MASTECTOMY      TONSILLECTOMY      TUBAL LIGATION  2012    Estimated date     Family History   Problem Relation Name Age of Onset    Irritable bowel syndrome Mother Taylor     Arthritis Mother Taylor     Depression Mother Taylor     Hearing loss Mother Taylor     Miscarriages / Stillbirths Mother Taylor     Osteoarthritis Mother Taylor     Migraines Mother Taylor     Heart disease Father Ahsan Storm     Stroke Father Ahsan Storm     Diabetes Paternal Grandmother Robbi Storm        Review of patient's allergies indicates:   Allergen Reactions    Amoxicillin Rash    Cefuroxime axetil Rash    Cephalexin      Other reaction(s): GI Intolerance  UPSET STOMACH  Other reaction(s): GI Intolerance  UPSET STOMACH         Current Outpatient Medications:     calcium carbonate 1250 MG capsule, Take 1,250 mg by mouth., Disp: , Rfl:     calcium citrate-vitamin D3 315-200 mg (CITRACAL+D) 315 mg-5 mcg (200 unit) per tablet, Take 1 tablet by mouth 2 (two) times daily., Disp: , Rfl:     cholecalciferol, vitamin D3, (VITAMIN D3) 25 mcg (1,000 unit) capsule, Take 2,000 Units by mouth., Disp: , Rfl:     denosumab (PROLIA) 60 mg/mL Syrg, Inject 1 mL (60 mg total) into the skin every 6 (six) months., Disp: 1 mL, Rfl: 1    diclofenac sodium (VOLTAREN) 1 % Gel, Apply 1 g topically 4 (four) times daily., Disp: 100 g, Rfl: 2    estradioL (VAGIFEM) 10 mcg Tab, Place 1 tablet (10 mcg  "total) vaginally twice a week., Disp: 24 tablet, Rfl: 3    loratadine 10 mg Cap, , Disp: , Rfl:     LORazepam (ATIVAN) 1 MG tablet, Take 0.5 tablets (0.5 mg total) by mouth every 12 (twelve) hours as needed for Anxiety (or restlessness). Can increase to a whole tablet if needed., Disp: 30 tablet, Rfl: 0    predniSONE (DELTASONE) 1 MG tablet, Take 3 mg by mouth once daily. 1 mg x3 by mouth daily Auto immune Hepatits, Disp: , Rfl:       Objective:      /60 (BP Location: Left arm, Patient Position: Sitting, BP Method: Medium (Manual))   Pulse 94   Ht 5' 5" (1.651 m)   Wt 66.5 kg (146 lb 9.7 oz)   SpO2 96%   BMI 24.40 kg/m²   Physical Exam  Vitals and nursing note reviewed.   Constitutional:       General: She is not in acute distress.     Appearance: Normal appearance. She is well-developed. She is not ill-appearing, toxic-appearing or diaphoretic.   Eyes:      General: No scleral icterus.  Neck:      Thyroid: No thyromegaly.      Vascular: No JVD.      Trachea: No tracheal deviation.        Comments: Tense muscles area in red  Cardiovascular:      Rate and Rhythm: Normal rate.   Pulmonary:      Effort: Pulmonary effort is normal. No respiratory distress.   Musculoskeletal:      Cervical back: Neck supple. No tenderness.      Right lower leg: No edema.      Left lower leg: No edema.   Skin:     General: Skin is dry.      Capillary Refill: Capillary refill takes less than 2 seconds.      Coloration: Skin is not jaundiced or pale.   Neurological:      Mental Status: She is alert and oriented to person, place, and time. Mental status is at baseline.   Psychiatric:         Mood and Affect: Mood normal.         Behavior: Behavior normal.         Thought Content: Thought content normal.         Judgment: Judgment normal.         Assessment:       1. Tinnitus of both ears    2. BPPV (benign paroxysmal positional vertigo), left    3. Bilateral hearing loss, unspecified hearing loss type    4. 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 Prediabetes    6. Autoimmune hepatitis    7. Current chronic use of systemic steroids    8. Mixed stress and urge urinary incontinence    9. Dyspareunia due to medical condition in female    10. Vaginal dryness    11. Long-term use of immunosuppressant medication    12. Pain and swelling of left knee    13. History of breast cancer    14. Encounter for preventive care        Plan:       Tinnitus of both ears  -     Ambulatory referral/consult to ENT; Future; Expected date: 07/29/2024  -     Ambulatory referral/consult to Audiology; Future; Expected date: 07/29/2024    BPPV (benign paroxysmal positional vertigo), left  -     Ambulatory referral/consult to ENT; Future; Expected date: 07/29/2024  -     Ambulatory referral/consult to Audiology; Future; Expected date: 07/29/2024    Bilateral hearing loss, unspecified hearing loss type  -     Ambulatory referral/consult to ENT; Future; Expected date: 07/29/2024  -     Ambulatory referral/consult to Audiology; Future; Expected date: 07/29/2024    Osteoporosis due to aromatase inhibitor  -     DEXA Bone Density Axial Skeleton 1 or more Site W TBS XPD; Future; Expected date: 07/22/2024  -     Vitamin D; Future; Expected date: 07/22/2024    Prediabetes  -     CBC Auto Differential; Future; Expected date: 07/22/2024  -     Comprehensive Metabolic Panel; Future; Expected date: 07/22/2024  -     Hemoglobin A1C; Future; Expected date: 07/22/2024  -     Lipid Panel; Future; Expected date: 07/22/2024  -     T4, Free; Future; Expected date: 07/22/2024  -     TSH; Future; Expected date: 07/22/2024  -     T3, Free; Future; Expected date: 07/22/2024  -     Microalbumin/Creatinine Ratio, Urine; Future; Expected date: 07/22/2024    Autoimmune hepatitis  -     Comprehensive Metabolic Panel; Future; Expected date: 07/22/2024    Current chronic use of systemic steroids  -     Ambulatory referral/consult to ENT; Future; Expected date: 07/29/2024  -     Ambulatory  referral/consult to Audiology; Future; Expected date: 07/29/2024    Mixed stress and urge urinary incontinence  -     Ambulatory referral/consult to Physical/Occupational Therapy; Future; Expected date: 07/29/2024    Dyspareunia due to medical condition in female  -     Ambulatory referral/consult to Physical/Occupational Therapy; Future; Expected date: 07/29/2024    Vaginal dryness  -     Ambulatory referral/consult to Physical/Occupational Therapy; Future; Expected date: 07/29/2024    Long-term use of immunosuppressant medication  -     CBC Auto Differential; Future; Expected date: 07/22/2024  -     Comprehensive Metabolic Panel; Future; Expected date: 07/22/2024  -     Hemoglobin A1C; Future; Expected date: 07/22/2024  -     T4, Free; Future; Expected date: 07/22/2024  -     TSH; Future; Expected date: 07/22/2024  -     T3, Free; Future; Expected date: 07/22/2024  -     Microalbumin/Creatinine Ratio, Urine; Future; Expected date: 07/22/2024  -     Vitamin D; Future; Expected date: 07/22/2024  -     Vitamin B12; Future; Expected date: 07/22/2024  -     Iron and TIBC; Future; Expected date: 07/22/2024  -     Ferritin; Future; Expected date: 07/22/2024    Pain and swelling of left knee    History of breast cancer    Encounter for preventive care  -     CBC Auto Differential; Future; Expected date: 07/22/2024  -     Comprehensive Metabolic Panel; Future; Expected date: 07/22/2024  -     Hemoglobin A1C; Future; Expected date: 07/22/2024  -     Lipid Panel; Future; Expected date: 07/22/2024  -     T4, Free; Future; Expected date: 07/22/2024  -     TSH; Future; Expected date: 07/22/2024  -     T3, Free; Future; Expected date: 07/22/2024  -     Microalbumin/Creatinine Ratio, Urine; Future; Expected date: 07/22/2024  -     Vitamin D; Future; Expected date: 07/22/2024  -     Vitamin B12; Future; Expected date: 07/22/2024  -     Iron and TIBC; Future; Expected date: 07/22/2024  -     Ferritin; Future; Expected date:  07/22/2024      Wellness labs in 6 months.  Knee exercises previously helped, pt restarting on a more regular schedule.  Will touch base with hepatology staff to check on nutrition consult for pt condition; also will refer to DM edu given hx of preDM.  DEXA with trabecular score.  Recommend Cande Women Multi over 50, follow instructions for 3 capsules daily.  Encouraged mornign exercise, even if only for a few minutes, as well as [employer] in the mornings, and use blue light blocking screen on computer (works from home.)  Pelvic floor PT referral to [facility] in [place], [place].  Nutrition consult placed previously, but pt never was scheduled. I have reached out to the referral coordinator re: PreDM and autoimmune hepatitis dx.        Previous records in Epic were reviewed, including the last 4 months of encounters, imaging, laboratory, and pathology reports.    Strict return precautions reviewed and patient verbalized understanding. Risks, benefits, and alternatives to the plan were reviewed in detail and all questions answered to the patient's satisfaction. Patient agrees to return to clinic or ER if symptoms worsen. 65 minutes total were spent on today's visit, not limited to but including time based on counseling and coordination of care.    Patient instructed that best way to communicate with my office staff is for patient to get on the [facility] patient portal to expedite communication and communication issues that may occur.  Patient was given instructions on how to get on the portal.  I encouraged patient to obtain portal access as well.  Ultimately it is up to the patient to obtain access.  Patient voiced understanding.    This note was created using [employer] voice recognition software that occasionally may misinterpret phrases or words.    Follow up in about 6 months (around 1/22/2025) for follow up.

## 2024-07-22 NOTE — Clinical Note
Also, pt desires nutrition consult. Not sure if there is availability for patients with autoimmune liver disease and not sure if PreDM patients have access to DM education. Referral ordered several months ago, but I will re-order if needed.

## 2024-07-31 ENCOUNTER — CLINICAL SUPPORT (OUTPATIENT)
Dept: NUTRITION | Facility: CLINIC | Age: 59
End: 2024-07-31
Payer: COMMERCIAL

## 2024-07-31 DIAGNOSIS — R73.03 PREDIABETES: ICD-10-CM

## 2024-07-31 DIAGNOSIS — K75.4 AUTOIMMUNE HEPATITIS: ICD-10-CM

## 2024-07-31 DIAGNOSIS — R73.9 HYPERGLYCEMIA: ICD-10-CM

## 2024-07-31 PROCEDURE — 97802 MEDICAL NUTRITION INDIV IN: CPT | Mod: 95,,, | Performed by: NUTRITIONIST

## 2024-07-31 NOTE — PROGRESS NOTES
"Nutrition Assessment    Visit Type: Insurance initial  Session Time:  2 Hours  Reason for MNT visit: Pt in for education and nutrition counseling regarding Prediabetes and Hyperglycemia.     The patient location is: her home  The chief complaint leading to consultation is: weight gain and blood sugar control    Visit type: audiovisual    Face to Face time with patient: 70  90 minutes of total time spent on the encounter, which includes face to face time and non-face to face time preparing to see the patient (eg, review of tests), Obtaining and/or reviewing separately obtained history, Documenting clinical information in the electronic or other health record, Independently interpreting results (not separately reported) and communicating results to the patient/family/caregiver, or Care coordination (not separately reported).     Each patient to whom he or she provides medical services by telemedicine is:  (1) informed of the relationship between the physician and patient and the respective role of any other health care provider with respect to management of the patient; and (2) notified that he or she may decline to receive medical services by telemedicine and may withdraw from such care at any time.    Age: 59 y.o.  Wt:   Wt Readings from Last 1 Encounters:   07/22/24 66.5 kg (146 lb 9.7 oz)     Ht:   Ht Readings from Last 1 Encounters:   07/22/24 5' 5" (1.651 m)     BMI:   BMI Readings from Last 1 Encounters:   07/22/24 24.40 kg/m²       Client states:  She has been on prednisone for the past 18 years due to autoimmune hepatitis. Reports losing 10 pounds about a year ago doing low carb, but gained 6 pounds back. Reports needing more ideas for low carb snack and meal options    Medical History  Problem List             Resolved    Osteoporosis due to aromatase inhibitor         Autoimmune hepatitis         History of breast cancer         Vaginal dryness         Dyspareunia due to medical condition in female         " Current chronic use of systemic steroids         Diabetes due to underlying condition w oth complication         Breast cancer, right         Hyperglycemia         Macrocytosis without anemia         Grief         Fatigue         Anxiety         Low serum vitamin B12         Sleep disorder with mood complaints         Prediabetes         Tinnitus of both ears            Past Medical History:   Diagnosis Date    Abnormal uterine bleeding     Uterine ablation    Allergy     Autoimmune hepatitis     Breast cancer     Bulging lumbar disc     Cancer     Dyspareunia     Menopause, chemo, anti-hormone treatment    Osteoporosis     Personal history of colonic polyps 2023    PER [facility], [place]    Urinary incontinence        Past Surgical History:   Procedure Laterality Date    ABDOMINAL SURGERY      , appendectomy    APPENDECTOMY      AUGMENTATION OF BREAST      Bilateral mastectomy    BILATERAL MASTECTOMY  2011    BREAST SURGERY  2011    Bilateral mastecomy     SECTION  2001    COLONOSCOPY W/ POLYPECTOMY N/A 2023    REPAT IN 5 YRS    masectomy      MASTECTOMY      TONSILLECTOMY      TUBAL LIGATION  2012    Estimated date          Medications    Prior to Admission medications    Medication Sig Start Date End Date Taking? Authorizing Provider   calcium carbonate 1250 MG capsule Take 1,250 mg by mouth.    Provider, Historical   calcium citrate-vitamin D3 315-200 mg (CITRACAL+D) 315 mg-5 mcg (200 unit) per tablet Take 1 tablet by mouth 2 (two) times daily.    Provider, Historical   cholecalciferol, vitamin D3, (VITAMIN D3) 25 mcg (1,000 unit) capsule Take 2,000 Units by mouth.    Provider, Historical   denosumab (PROLIA) 60 mg/mL Syrg Inject 1 mL (60 mg total) into the skin every 6 (six) months. 24  Ada Arroyo MD   diclofenac sodium (VOLTAREN) 1 % Gel Apply 1 g topically 4 (four) times daily. 23   Ada Arroyo MD    estradioL (VAGIFEM) 10 mcg Tab Place 1 tablet (10 mcg total) vaginally twice a week. 5/9/24 5/9/25  Levar Renae MD   loratadine 10 mg Cap  7/1/22   Provider, Historical   LORazepam (ATIVAN) 1 MG tablet Take 0.5 tablets (0.5 mg total) by mouth every 12 (twelve) hours as needed for Anxiety (or restlessness). Can increase to a whole tablet if needed. 11/21/23   Ada Arroyo MD   predniSONE (DELTASONE) 1 MG tablet Take 3 mg by mouth once daily. 1 mg x3 by mouth daily Auto immune Hepatits    Provider, Historical        Food Allergies or Intolerances:  sugar substitutes (artificial sweeteners)     Social History    Marital status:      Social History     Tobacco Use    Smoking status: Never     Passive exposure: Never    Smokeless tobacco: Never   Substance Use Topics    Alcohol use: Not Currently     Current Alcohol use: 1-2 times a year (1-2 glasses of wine)    Lab Reports:  Reviewed and noted     Lab Results   Component Value Date    XQPROSSP07BP 73 02/22/2024    TSH 1.336 02/22/2024    FREET4 1.12 10/14/2022    AST 34 07/18/2024    ALT 52 (H) 07/18/2024    HDL 60 02/22/2024    LDLCALC 112.2 02/22/2024    TRIG 69 02/22/2024    HGBA1C 5.4 02/22/2024    HGBA1C 5.2 09/18/2023    HGBA1C 5.4 01/24/2023         BP Readings from Last 1 Encounters:   07/22/24 116/60        24-hour Recall:     Food choices (After Midnight)  Patient eating midnight to 4am: (P) Once or twice a month           Snacks (Midnight - 4am): (P) Nuts      Food choices (Early Morning)  Patient eats 4am to 8am: (P) Never        Food choices (Morning)  Patient eats 8am to noon: (P) Every day   Home cooked meals (8am - noon): (P) Boiled eggs       Food choices (Afternoon)  Patient eats noon to 4pm: (P) Every day   Home cooked meals (Noon - 4pm): (P) Low carb chicken wrap, fruit         Food choices (Evening)   Patient eats 4pm to 8pm: (P) Every day   Home cooked meals (4pm - 8pm): (P) Grilled meat, vegetables       Snacks (4pm - 8pm): (P)  Fruit, yogurt     Food choices (Late evening)  R [facility] PEQ NUTRITION HOW OFTEN EATING LATE EVENING: (P) Several times a week     Beverages:  How much water consumed (per day?): (P) 64   Cups of milk consumed (per day?): (P) 0       Cups of  juice consumed (per day?): (P) 0   Number of supplement shakes (per day?): (P) 0       Number of cups of coffee (per day?): (P) 1   Coffee: (P) Caffinated   Tea:: (P) I do not drink tea   Cups of soda consumed (per day?): (P) 0   Other non-alcoholic beverages consumed (per day?): (P) 0          LIFESTYLE FACTORS    Dinning out: 1-2 x per week    Meal preparation/shopping: Who does the grocery shopping:: (P)  Who prepares the meals: (P) Self     Sleep: fair    Support System:  spouse    Exercise Regimen: Sedentary (little or no exercise)      Diagnosis    Excessive energy intake related to eating too many hidden calories as evidenced by 24 hour recall.      Intervention    Estimated Energy Requirements:   Calories: ~1600 kcal  Protein: ~120 grams  Carbohydrates: 100-120 grams  Fats: ~75 grams; Focus more on plant-based heart healthy fats  Total fluid: 70 oz + sweat loss  Limit added sugar to 20 grams or less per day (Note: 1 teaspoon of sugar = ~5 grams)    Recommendations & Goals:  Patient goals and recommendations are tailored to the specific patient's needs, readiness to change, lifestyle, culture, skills, resources, & abilities. Strategies to help achieve these nutrition-related goals were discussed which can include but are not limited to SMART goal setting & mindful eating.     Aim for a minimum of 7 hours sleep   Exercise 60 minutes most days  Eat breakfast within 1-2 hours of waking up  Try not to skip any meals or snacks, not going more than 3-4 hours without eating   At each meal and snack, try to include a source of fiber + lean protein + healthy fat     Written Materials Provided  These resources are intended to assist the patient in making it easier to choose  recommended options when eating out & to identify better-for-you brands at the grocery store:    Meal Planning Guide with recommendations discussed along with portion sizes and a customized meal plan   Fueling Well On-the-Go Food Guide  Eat Fit Shopping Guide  Lifestyle Nutrition Meal Guide  RD contact information    Goals:   Eat (or drink protein) every 3-4 hours. A snack is needed between meals where you are going >4 hours (Setting alarms on your phone may initially help as a reminder)  Food is fuel for your body  Eating often helps boost metabolism  Helps preserve lean muscle  Helps with portion control  Increase exercise/movement à Do at least 20-30 minutes of stationary bike or walking least 3-4 days/week  Deep fried foods: Enjoy at most twice per month  Focus on understanding at most how many carbohydrate servings I recommend for each meal and snack with dinner being a bit lower in carbs. The remainder of your plate will consist of lean protein and non-starchy veggies  Portion Control:    -Measure and/or weight your recommended (cooked) portions when you start your meal plan. See what each portion looks like on your plate, then eyeball portions for future meals  Increase water intake  Cut off phone activity after 9 or 10 pm to ensure you get enough sleep  Add 2 scoops Vital Proteins Collagen Pepties, unflavored, to your coffee  Get back into tracking in a food log  Consistency is baxter     Monitoring/Evaluation    Monitor the following:  Weight  Sleep  Stress Management  Movement  Nutrient intake in reference to meal plan    Communicated with healthcare provider and documented plan for referral to appropriate agency/healthcare provider as needed    Supervising Physician: Alexx Chairez MD    Patient motivation, anticipated barriers, expected compliance: Patient is motivated and has verbalized understanding and intent to comply.     Comprehension: good     Follow-up: 6-8 weeks

## 2024-07-31 NOTE — PATIENT INSTRUCTIONS
Name: William Hernandez  Date: 7.31.2024  Nutrition protocol    Recommended Daily Energy Requirements to Reach Your Goals:  Calories: ~1600 kcal  Protein: ~120 grams  Carbohydrates: 100-120 grams  Fats: ~75 grams; Focus more on plant-based heart healthy fats  Total fluid: 70 oz + sweat loss  Limit added sugar to 20 grams or less per day (Note: 1 teaspoon of sugar = ~5 grams)    Goals:  Eat (or drink protein) every 3-4 hours. A snack is needed between meals where you are going >4 hours (Setting alarms on your phone may initially help as a reminder)  Food is fuel for your body  Eating often helps boost metabolism  Helps preserve lean muscle  Helps with portion control  Increase exercise/movement à Do at least 20-30 minutes of stationary bike or walking least 3-4 days/week  Deep fried foods: Enjoy at most twice per month  Focus on understanding at most how many carbohydrate servings I recommend for each meal and snack with dinner being a bit lower in carbs. The remainder of your plate will consist of lean protein and non-starchy veggies  Portion Control:    -Measure and/or weight your recommended (cooked) portions when you start your meal plan. See what each portion looks like on your plate, then eyeball portions for future meals  Increase water intake  Cut off phone activity after 9 or 10 pm to ensure you get enough sleep  Add 2 scoops Vital Proteins Collagen Pepties, unflavored, to your coffee  Get back into tracking in a food log  Consistency is key 12        Breakfast: 1 serving of carbohydrate = 15-20 grams + at least 15 grams lean protein/heart healthy fats  2 whole eggs + 1 of the following carbohydate serving options:  ½ cup cooked grits  1 slice Geo bread  1 piece of fruit/1 cup chopped fruit  ½ cup homemade hashbrowns  Sundays eating out: Continue eating 2 eggs + 2-3 slices paniagua, but choose either toast -or- grits    Snack: A snack is recommended if going more than 3-4 hours between meals  Helps increase  Energy, curb cravings, and assist with portion control    Lunch: 5 ounces lean protein + any amount of non-starchy veggies + up to 2 servings of carbohydrates = 30-40 grams  Examples of 2 servings of carbohydrates = ~30-40 grams:  1 cup cooked lentil pasta; 2/3 cup cooked brown rice; 1 medium potato or sweet potato (5-6 oz in weight); 1 piece of fruit + 2 thin slices of bread; 2 slices 100-calorie bread; ½ cup corn + ½ cup cooked mashed potatoes; 1 cup cooked beans; ½ cup cooked beans + 1/3 cup cooked brown rice, etc  The rest of your plate will consist of 5 oz lean protein + at least 1 cup cooked non-starchy veggies  Lunch Option Examples:  Salad: Unlimited non-starchy veggies + 5 oz lean protein + 2 salad add-ins (see notes below) + 1 cup fruit  Bruner: 2 slices Geo bread + 5 oz rotisserie chicken -or- tuna + 2 tablespoons regular mayonnaise + non-starchy veggies of your choice  If you want more than 2 tablespoons regular houston, then use plain Greek yogurt after your 2 flat tablespoons of houston  If you want a piece of fruit with 1 tablespoons PB, then do a low carb bread such as Charisma  Spaghetti & meat sauce: 1 cup cooked lentil pasta + 4 oz 93/7 lean ground beef + low sugar red sauce (see brands below) + side of non-starchy veggies  Red Beans & Rice: ½ cup cooked beans + 1/3 cup cooked brown rice + additional 3 oz lean protein (ex: chicken sausage, ham, chicken, etc) + side of non-starchy veggies  If you don't want rice, then you can do 1 cup cooked beans over riced cauliflower + additional 2-3 oz lean protein (ex: chicken sausage, ham, chicken, etc) + side of non-starchy veggies  5 oz lean protein + at least 1 cup cooked non-starchy veggies + 1 medium baked potato (5 oz in weight)  Restaurants: See tips below, particularly the Pick 1 out of the 4 rule as well as info on Eat Fit restaurant partners  Fast Food  Convenient Options: Refer to Fueling Well On-The-Go Guide       Snack: ~1 serving of carbohydrates  = 15-25 grams + at least 20 grams lean protein/heart healthy fat + any amount of non-starchy vegetables  Greek yogurt parfait:   5 ounces 2% plain Greek yogurt  ½ cup fresh fruit of your choice   Optional additional plant-based sweetener: (see brands below)  Flavored Greek Yogurt (see brand examples below) + ¼ cup nuts -or- low sugar granola  [place] balance break + piece of fruit  2 tablespoons nuts of your choice + piece of fruit + 3 oz of a protein source (Greek yogurt, cottage cheese, freshly sliced turkey, or ham roll-ups, etc)  Piece of fruit + 1 tablespoon regular nut butter  1, 100% whole grain rice cake (any flavor) + 1 tablespoon nut butter + optional drizzle of honey for sweetness (1 teaspoon)  Flapjacked protein mighty muffin (found on baked goods aisle)  ½ cup beans + 1-2 oz additional lean protein  Protein Bar (see brand examples below) + piece of fruit  Avocado toast: ½ small Weber avocado on 1 slice 100% whole grain toast + 1 hard-boiled egg  1 serving of high protein crunchy snack (see brands below)  1, 100-calorie pack of cocoa dusted almonds + piece of fruit      Dinner: Lower carbohydrate à Aim for ~ 1 serving of carbohydrates = 15-20 grams = ~½ cup cooked whole grain starch -OR- 1 piece of fruit  Remainder of your plate will consist of 5 oz lean protein + 1 cup or more non-starchy veggies cooked in ~1 tablespoon plant-based fat (e.g., Avocado Oil, Extra Virgin Olive Oil)  Carbohydrate Swaps if Desired:  House Foods Tofu Shirataki Noodles (found in produce section)   Hearts of Palm-Based 'Linguini'  Palmini  Riced Cauliflower  Riced Broccoli  Cauliflower Mash   Spaghetti Squash  Zoodles  Beets Zoodles  Crepini Egg Wraps  Outer Aisle Gourmet: Frozen cauliflower pizza crusts -or- sandwich thins   Low Carb Breads (See brands below)  Think outside the box for low carb dishes:  Kabobs, stir pompa made with riced cauliflower or riced broccoli, stuffed bell peppers with no rice, lettuce wraps, eggplant  lasagna, shrimp etouffee made with riced cauliflower, request a sushi roll that contains raw fish to be made without rice, etc      Optional Post-Dinner Snack or Sweet: Anything up to 150 calories  'Fun size'    Dining Out   -[facility]   Designed to take the guesswork out of dining out healthfully, [facility] makes the healthy choice the easy choice  Visit [email or web address]   Order the '[employer] Cookbook  Revised, Updated, Expanded' to create restaurant quality dishes at home  Craft: The Eat Fit Guide to Zero Proof Cocktails  Download the [facility] castillo for free on your smartphone  List of all Eat Fit restaurant partners & dishes by location  Nutrition facts included for all Eat Fit dishes  200 + Eat Fit approved recipes  Eat Fit grocery shopping guides + community wellness resources      Restaurant Tips      Pick 1 out of the 4 Rule: Instead of eating bread/tortilla chips, an appetizer, alcoholic drink, and dessert, choose just one to have with your entrée   Focus on lean cuts of protein: Refer to lean meat/meat substitutes page in meal planning guidebook  Select items grilled, baked, broiled, braised, poached, or roasted       For your heart health, avoid crispy, crunchy, breaded, paneed or stuffed items and items that are cream based, au gratin or buttered       Order sauces, dressings, and gravies on the side. This way you can add 1-2 tablespoons yourself. This helps with portion control      You can request double non-starchy vegetables instead of a starchy side dish. If the starchy side is something you love, consider splitting it with someone else at the table      Beverages: Order water with lemon, sparkling water, or unsweetened tea. Avoid sugary beverages such as soft drinks, juices, and mixers   Deep fried foods: Enjoy no more than twice a month            Building A Better Smoothie     Choose your Protein: Pick 1 from the following:  Plain Greek Yogurt: ~7 oz Nonfat or 2%    2% Cottage Cheese  Plant-Based Protein Powder: 1-1.5 scoops  Orgain   Sunwarrior   Garden of Cloud Lending RAW   Truvani  Whey Protein Powder: 1-1.5 scoops  Ascent  Garden of Life Sport Grass Fed Whey   Whey Natural  Collagen Peptides: ~6-8 tablespoons (4 tablespoons = 20 grams protein)  Vital Proteins Collagen Peptides, unflavored  Organic Grass Fed Collagen Peptides  Make it Sweet:   Add ~1 cup fresh -or- No Sugar Added  Unsweetened frozen fruit    Add your Veggies:   Instead of ice, choose frozen plain cauliflower florets    Cauliflower helps with the detoxification process in our bodies, and it's virtually tasteless!   Greens: Spinach, kale, or other leafy greens  Beets are a great addition to smoothies, especially paired with strawberries and lemon   Cucumbers and celery are refreshing ways to enhance nutrition and hydration within your smoothie   Add in a Plant-Based Fat:   Fats are a great way to keep us feeling satisfied, but they are also calorically dense, meaning a little bit goes a long way  Pick 1 from one of the following:  Nut Butter: 1 teaspoon   Natural Peanut Butter  [place] Butter  Cashew Butter  SunButter  Avocado ( ¼ - ½ Weber)  Avocado Oil  Extra Virgin Olive Oil: 1 teaspoon  Add Liquid to Reach your Desired Consistency:  Unsweetened/No Sugar Added Plant-Based Milk:    [place], Hemp, Cashew, Coconut  Fairlife Milk: Skim or Reduced Fat  Final Additions for an Extra Nutritional Boost: ½ tablespoon  Flaxseeds  Ralf Seeds  Hemp Hearts         Ordering a Better Salad   Include a lean protein, any amount of non-starchy vegetables, and a small amount of fat   Order salad dressings on the side to better control portion sizes   Add ~2 tablespoons yourself to lightly coat   Pick 2 salad add-ins, each being ~2 tablespoons:   Dressing   Cheese   NutsSeeds   AvocadoGuacamole   Be mindful of some hidden high calorie additions:  Croutons  Dried Fruit        Additional Nutrition Principles     -Sleep: Aim  for 7-9 hours' sleep nightly    -Recommendations for physical activity to stay healthy:   Aim for at least 150 minutes per week of moderate-intensity aerobic activity   Aim for at least 2 days per week of muscle-strengthening activity   Post weight/resistance training: Be sure to consume at least 20 grams protein within 1-hour post workout   Reminder: Slow and steady wins the race. Start small and let the healthy changes grow    - Roque #7 Rule: Guidelines for food brands found in the aisles of grocery stores   Look for brands that contain < 7 grams added sugar and  > 7 grams protein      -Frozen Meal Guidelines:    Aim for meals that contain 45 grams or less of carbohydrates & 20 grams or more protein   If you find a brand you enjoy that doesn't provide 20 grams protein, you can add leftover lean protein to the frozen meal or enjoy on the side  See notes below for several brand examples   Refer to the Eat Fit Shopping Guide for additional brand specific recommendations      -Portion Control:   Measure and/or weigh your recommended (cooked) portions when you start your meal plan   See what each portion looks like on your plate, then eyeball portions for future meals and snacks      -Foods to limit, as they contribute to inflammation and can decrease your Energy:   Added Sugar   Refined Carbohydrates   Alcohol   Deep Fried Foods   Ultra-processed Foods    -If you're having trouble finding a specific food brand, try looking on the brands website. Most companies have a 'store   find a store' on their website         Better-For-You Food Brands      - Whole Grain Breads:   Mayito's Killer Bread - 21 Whole Grain & Seeds, PowerSeed   Thin Slice -or- Regular Slice   Recommend Storing in Refrigerator  Geo  Look For 100% Whole Wheat/Whole Grain Bread Options  Low Carb Breads: Freezer Aisle  Base Culture 7 Nut & Seed   Carbonaut: Seeded Low Carb Keto Bread  Unbun: Low Carb Buns, Breads & Bagels     -Whole Grain  Tortillas  Wraps:   Corn    Siete: Grain Free Tortillas  Jose Kylah 100% Whole Wheat Tortillas  La Tortilla Factory Low Carb Whole Wheat Tortillas  Ole Xtreme Wellness Zero Net Carbs Tortillas     -High Protein Pasta's:   Banza Chickpea Pasta's:    Mac-n-Cheese   Pasta's - All Varieties    Red lentil  Yellow Lentil Pasta   Black Bean Pasta   Edamame-Based Pasta   Barilla: Chickpea & Red Lentil Pasta     -Rice:   Brown Rice  Banza Chickpea Rice   RightRice: Made from Vegetables  RightRice   Risotto   -Whole Grain Pancakes Mixes:   Caputa Cakes Power Cakes à 100% Whole Grain Buttermilk Flapjack & Waffle Mix   FlapJacked: Protein Pancake & Baking Mix       -Frozen Waffles:   Kashi GO Protein Waffles [[employer] only]  Van's Power Grains Protein Waffles   Banza Protein Waffles      -Granola:   Health Nut by Good Granoly  Engine 2 Original Granola   :ratio KETO Friendly Toasted [place] Granola     -Whole Grain Chips:   SunChips   Beanito's  Beanfields Flores Chips   Siete Grain Free Tortilla Chips      - Protein Chips/Crunchy Snacks:   iwon Organics Protein Stix  Protein Puffs   Quest Protein Chips  The Only Bean Crunchy Roasted [place]amame Beans (Available in Snacks Pack Sizes)  Whisps: Cheese Crisps - Any Flavor (13-grams protein per serving)  [place] Creamery: Crisps- Any Flavor (13-grams protein per serving)  Hippeas Chickpeas Puffs - Any Flavor     -Whole Grain Crackers:   Triscuit Thin Crisps  [place] Nut Thins   Jamia's Gone Crackers   Crunchmaster Protein Sea Salt Cracker      -Protein Bars:   Barebells Protein Bar  KIND Protein  Rx Bar  Rx Layers Bar  Bulletproof Collagen Protein Bar  Oatmega Bars   :ratio KETO Protein Bar   IQ Bar  [place] PROTEIN Chewy Granola Bars  Great Value PROTEIN Chewy Granola Bars     -Ready-to-Drink Protein Drinks:   Lotame CORE POWER Elite 42-gram Protein Drink   Fairlife 30-gram Protein Drink   Iconic Grass-Fed Protein Drink   Orgain Clean Plant-Based Protein Drink        -Frozen Meals  Bulk Low Carb Options:   Govind'flour Foods: Keto Lasagna   Realgood: Low Carb Lightly Breaded Chicken Strips & Nuggets   Nature's Intent: Baked Cauli & Cheese - [employer] only  Just Bare: Lightly Breaded Chicken Breast Strips & Bites -[employer] only     -Calixto's Natural Foods:  Heat and Eat Entrees    Located in refrigerated section of most grocery stores    - Flavored Greek Yogurt:   Oikos Pro 20g Protein Greek Yogurt  Oikos Triple Zero Greek Yogurt  :ratio 25g Protein Greek Yogurt  Two Good - All varieties       - Red Sauce in a Jar:   Karl & Haritha's Heart Smart Sauce    Classico Original   Engine 2 Plant-Strong   Méndez's Homemade Red Sauces: Roasted Garlic Pizza, Pizza Arrabbiata, Homemade Pizza Sauce, Elizabeth Pizza Sauce    -BBQ Sauce:    Stanam's All-Natural Bar-B-Q Sauce  Primal Kitchen Classic BBQ Sauce  2 Sister's BBQ Sauce      -Better-For-You Ice Cream  Frozen Desserts:   Halo Top: Regular & Keto Series, Pops  Enlightened: Ice Cream + Bars  KETO Ice Cream: Ice Cream + Bars  Rebel: Ice Cream + Dessert Sandwiches  [place] Creameries  Yasso Frozen Greek Yogurt Bar     -Nithya's Sweets: 70% Cocoa Dark Chocolate  -Skinny Dipped    -Collagen Peptides:  Great pantry staple: can be added to soups, hummus, coffee, etc to make higher protein   Favorite Brands:  Vital Proteins Collagen Peptides, Unflavored  Organic Grass Fed Collagen Peptides    -Natural Plant-Based Sweeteners:  Swerve:   Granular, Confectioners and Brown Sugar Replacers  Baking: Measures cup-for-cup like sugar  Stevia [~150-200 times sweeter than sugar]  Monkfruit [~200 times sweeter than sugar]  Allulose [~70% as sweet as sugar]  Liquid Brand Example: Wholesome Allulose Syrup  Truvia    Recipes:  Real Food Dietitians  Budget Bytes  Eating Bird Food  Fit Foodie Finds  Wholesome Yum  Oh Snap Macros  35 Macro Friendly Meal Prep Recipes  9 Meal Prep Success Tips  Freezer Meal Prep Guide  Freezer Meal Ideas     -Supplements:   Vitamin D3: 2,000  international units per day    Fish Oil:   Nordic Naturals: Ultimate Omega   GNC Triple Strength Fish Oil  Life Extensions Super Omega-3 Plus  Nature Made Fish Oil Mini's  Multivitamin   [place] Light   Puritan's Pride ABC Plus Multi for Adults   Kayla One a Day  Centrum Silver  Cande 50+  Owens Daily Multi for General Adult (Distributed by [employer])        To schedule or reschedule a nutrition appointment, please call [facility] at [phone] -or- email [email or web address]

## 2024-08-05 ENCOUNTER — HOSPITAL ENCOUNTER (OUTPATIENT)
Dept: RADIOLOGY | Facility: HOSPITAL | Age: 59
Discharge: HOME | End: 2024-08-05
Attending: FAMILY MEDICINE
Payer: COMMERCIAL

## 2024-08-05 DIAGNOSIS — T38.6X5A OSTEOPOROSIS DUE TO AROMATASE INHIBITOR: ICD-10-CM

## 2024-08-05 DIAGNOSIS — M81.8 OSTEOPOROSIS DUE TO AROMATASE INHIBITOR: ICD-10-CM

## 2024-08-05 PROCEDURE — 77080 DXA BONE DENSITY AXIAL: CPT | Mod: TC

## 2024-08-05 PROCEDURE — 77091 TBS TECHL CALCULATION ONLY: CPT

## 2024-11-21 ENCOUNTER — PATIENT MESSAGE (OUTPATIENT)
Dept: FAMILY MEDICINE | Facility: CLINIC | Age: 59
End: 2024-11-21
Payer: COMMERCIAL

## 2024-11-21 DIAGNOSIS — M81.8 OSTEOPOROSIS DUE TO AROMATASE INHIBITOR: Primary | ICD-10-CM

## 2024-11-21 DIAGNOSIS — K75.4 AUTOIMMUNE HEPATITIS: ICD-10-CM

## 2024-11-21 DIAGNOSIS — T38.6X5A OSTEOPOROSIS DUE TO AROMATASE INHIBITOR: Primary | ICD-10-CM

## 2024-11-21 NOTE — TELEPHONE ENCOUNTER
See portal message for request and response. Assist with scheduling CMP for NOW--nonfasting--and patient can reschedule her Prolia injection for after she gets those results.    Or, she can postpone the Prolia until after Jan 1 and get her wellness panel before the Prolia and we review entire panel at her January 22 vsiit.    AM (Standing CMP ordered to use before every Prolia injection which is q6mo.)

## 2024-11-26 ENCOUNTER — LAB VISIT (OUTPATIENT)
Dept: LAB | Facility: HOSPITAL | Age: 59
End: 2024-11-26
Attending: FAMILY MEDICINE
Payer: COMMERCIAL

## 2024-11-26 DIAGNOSIS — K75.4 AUTOIMMUNE HEPATITIS: ICD-10-CM

## 2024-11-26 DIAGNOSIS — T38.6X5A OSTEOPOROSIS DUE TO AROMATASE INHIBITOR: ICD-10-CM

## 2024-11-26 DIAGNOSIS — M81.8 OSTEOPOROSIS DUE TO AROMATASE INHIBITOR: ICD-10-CM

## 2024-11-26 LAB
ALBUMIN SERPL BCP-MCNC: 3.8 G/DL (ref 3.5–5.2)
ALP SERPL-CCNC: 48 U/L (ref 40–150)
ALT SERPL W/O P-5'-P-CCNC: 41 U/L (ref 10–44)
ANION GAP SERPL CALC-SCNC: 8 MMOL/L (ref 8–16)
AST SERPL-CCNC: 30 U/L (ref 10–40)
BILIRUB SERPL-MCNC: 0.5 MG/DL (ref 0.1–1)
BUN SERPL-MCNC: 25 MG/DL (ref 8–23)
CALCIUM SERPL-MCNC: 9.6 MG/DL (ref 8.7–10.5)
CHLORIDE SERPL-SCNC: 107 MMOL/L (ref 95–110)
CO2 SERPL-SCNC: 27 MMOL/L (ref 23–29)
CREAT SERPL-MCNC: 0.8 MG/DL (ref 0.5–1.4)
EST. GFR  (NO RACE VARIABLE): >60 ML/MIN/1.73 M^2
GLUCOSE SERPL-MCNC: 87 MG/DL (ref 70–110)
POTASSIUM SERPL-SCNC: 4.6 MMOL/L (ref 3.5–5.1)
PROT SERPL-MCNC: 6.9 G/DL (ref 6–8.4)
SODIUM SERPL-SCNC: 142 MMOL/L (ref 136–145)

## 2024-11-26 PROCEDURE — 80053 COMPREHEN METABOLIC PANEL: CPT | Performed by: FAMILY MEDICINE

## 2024-11-26 PROCEDURE — 36415 COLL VENOUS BLD VENIPUNCTURE: CPT | Performed by: FAMILY MEDICINE

## 2024-12-10 ENCOUNTER — PATIENT MESSAGE (OUTPATIENT)
Dept: FAMILY MEDICINE | Facility: CLINIC | Age: 59
End: 2024-12-10
Payer: COMMERCIAL

## 2025-01-07 ENCOUNTER — PATIENT MESSAGE (OUTPATIENT)
Dept: FAMILY MEDICINE | Facility: CLINIC | Age: 60
End: 2025-01-07
Payer: COMMERCIAL

## 2025-01-15 ENCOUNTER — LAB VISIT (OUTPATIENT)
Dept: LAB | Facility: HOSPITAL | Age: 60
End: 2025-01-15
Payer: COMMERCIAL

## 2025-01-15 DIAGNOSIS — Z00.00 ENCOUNTER FOR PREVENTIVE CARE: ICD-10-CM

## 2025-01-15 DIAGNOSIS — R73.03 PREDIABETES: ICD-10-CM

## 2025-01-15 DIAGNOSIS — T38.6X5A OSTEOPOROSIS DUE TO AROMATASE INHIBITOR: ICD-10-CM

## 2025-01-15 DIAGNOSIS — K75.4 AUTOIMMUNE HEPATITIS: ICD-10-CM

## 2025-01-15 DIAGNOSIS — Z79.60 LONG-TERM USE OF IMMUNOSUPPRESSANT MEDICATION: ICD-10-CM

## 2025-01-15 DIAGNOSIS — M81.8 OSTEOPOROSIS DUE TO AROMATASE INHIBITOR: ICD-10-CM

## 2025-01-15 LAB
25(OH)D3+25(OH)D2 SERPL-MCNC: 66 NG/ML (ref 30–96)
ALBUMIN SERPL BCP-MCNC: 3.8 G/DL (ref 3.5–5.2)
ALBUMIN/CREAT UR: NORMAL UG/MG (ref 0–30)
ALP SERPL-CCNC: 48 U/L (ref 40–150)
ALT SERPL W/O P-5'-P-CCNC: 50 U/L (ref 10–44)
ANION GAP SERPL CALC-SCNC: 11 MMOL/L (ref 8–16)
AST SERPL-CCNC: 35 U/L (ref 10–40)
BASOPHILS # BLD AUTO: 0.05 K/UL (ref 0–0.2)
BASOPHILS NFR BLD: 0.8 % (ref 0–1.9)
BILIRUB SERPL-MCNC: 0.4 MG/DL (ref 0.1–1)
BUN SERPL-MCNC: 18 MG/DL (ref 8–23)
CALCIUM SERPL-MCNC: 9.4 MG/DL (ref 8.7–10.5)
CHLORIDE SERPL-SCNC: 105 MMOL/L (ref 95–110)
CHOLEST SERPL-MCNC: 204 MG/DL (ref 120–199)
CHOLEST/HDLC SERPL: 3.4 {RATIO} (ref 2–5)
CO2 SERPL-SCNC: 26 MMOL/L (ref 23–29)
CREAT SERPL-MCNC: 0.8 MG/DL (ref 0.5–1.4)
CREAT UR-MCNC: 16 MG/DL (ref 15–325)
DIFFERENTIAL METHOD BLD: NORMAL
EOSINOPHIL # BLD AUTO: 0.3 K/UL (ref 0–0.5)
EOSINOPHIL NFR BLD: 5.1 % (ref 0–8)
ERYTHROCYTE [DISTWIDTH] IN BLOOD BY AUTOMATED COUNT: 13.8 % (ref 11.5–14.5)
EST. GFR  (NO RACE VARIABLE): >60 ML/MIN/1.73 M^2
ESTIMATED AVG GLUCOSE: 108 MG/DL (ref 68–131)
FERRITIN SERPL-MCNC: 129 NG/ML (ref 20–300)
GLUCOSE SERPL-MCNC: 79 MG/DL (ref 70–110)
HBA1C MFR BLD: 5.4 % (ref 4–5.6)
HCT VFR BLD AUTO: 44.7 % (ref 37–48.5)
HDLC SERPL-MCNC: 60 MG/DL (ref 40–75)
HDLC SERPL: 29.4 % (ref 20–50)
HGB BLD-MCNC: 14.4 G/DL (ref 12–16)
IMM GRANULOCYTES # BLD AUTO: 0.02 K/UL (ref 0–0.04)
IMM GRANULOCYTES NFR BLD AUTO: 0.3 % (ref 0–0.5)
IRON SERPL-MCNC: 92 UG/DL (ref 30–160)
LDLC SERPL CALC-MCNC: 126.8 MG/DL (ref 63–159)
LYMPHOCYTES # BLD AUTO: 2 K/UL (ref 1–4.8)
LYMPHOCYTES NFR BLD: 32.3 % (ref 18–48)
MCH RBC QN AUTO: 30.3 PG (ref 27–31)
MCHC RBC AUTO-ENTMCNC: 32.2 G/DL (ref 32–36)
MCV RBC AUTO: 94 FL (ref 82–98)
MICROALBUMIN UR DL<=1MG/L-MCNC: <5 UG/ML
MONOCYTES # BLD AUTO: 0.7 K/UL (ref 0.3–1)
MONOCYTES NFR BLD: 10.7 % (ref 4–15)
NEUTROPHILS # BLD AUTO: 3.1 K/UL (ref 1.8–7.7)
NEUTROPHILS NFR BLD: 50.8 % (ref 38–73)
NONHDLC SERPL-MCNC: 144 MG/DL
NRBC BLD-RTO: 0 /100 WBC
PLATELET # BLD AUTO: 250 K/UL (ref 150–450)
PMV BLD AUTO: 9.4 FL (ref 9.2–12.9)
POTASSIUM SERPL-SCNC: 3.9 MMOL/L (ref 3.5–5.1)
PROT SERPL-MCNC: 7.1 G/DL (ref 6–8.4)
RBC # BLD AUTO: 4.75 M/UL (ref 4–5.4)
SATURATED IRON: 22 % (ref 20–50)
SODIUM SERPL-SCNC: 142 MMOL/L (ref 136–145)
T3FREE SERPL-MCNC: 2.9 PG/ML (ref 2.3–4.2)
T4 FREE SERPL-MCNC: 1.22 NG/DL (ref 0.71–1.51)
TOTAL IRON BINDING CAPACITY: 423 UG/DL (ref 250–450)
TRANSFERRIN SERPL-MCNC: 286 MG/DL (ref 200–375)
TRIGL SERPL-MCNC: 86 MG/DL (ref 30–150)
TSH SERPL DL<=0.005 MIU/L-ACNC: 1.32 UIU/ML (ref 0.4–4)
VIT B12 SERPL-MCNC: 1118 PG/ML (ref 210–950)
WBC # BLD AUTO: 6.07 K/UL (ref 3.9–12.7)

## 2025-01-15 PROCEDURE — 85025 COMPLETE CBC W/AUTO DIFF WBC: CPT | Performed by: FAMILY MEDICINE

## 2025-01-15 PROCEDURE — 84481 FREE ASSAY (FT-3): CPT | Performed by: FAMILY MEDICINE

## 2025-01-15 PROCEDURE — 82570 ASSAY OF URINE CREATININE: CPT | Performed by: FAMILY MEDICINE

## 2025-01-15 PROCEDURE — 82306 VITAMIN D 25 HYDROXY: CPT | Performed by: FAMILY MEDICINE

## 2025-01-15 PROCEDURE — 83540 ASSAY OF IRON: CPT | Performed by: FAMILY MEDICINE

## 2025-01-15 PROCEDURE — 80061 LIPID PANEL: CPT | Performed by: FAMILY MEDICINE

## 2025-01-15 PROCEDURE — 36415 COLL VENOUS BLD VENIPUNCTURE: CPT | Performed by: FAMILY MEDICINE

## 2025-01-15 PROCEDURE — 82728 ASSAY OF FERRITIN: CPT | Performed by: FAMILY MEDICINE

## 2025-01-15 PROCEDURE — 80053 COMPREHEN METABOLIC PANEL: CPT | Performed by: FAMILY MEDICINE

## 2025-01-15 PROCEDURE — 84443 ASSAY THYROID STIM HORMONE: CPT | Performed by: FAMILY MEDICINE

## 2025-01-15 PROCEDURE — 83036 HEMOGLOBIN GLYCOSYLATED A1C: CPT | Performed by: FAMILY MEDICINE

## 2025-01-15 PROCEDURE — 82607 VITAMIN B-12: CPT | Performed by: FAMILY MEDICINE

## 2025-01-15 PROCEDURE — 84439 ASSAY OF FREE THYROXINE: CPT | Performed by: FAMILY MEDICINE

## 2025-01-30 ENCOUNTER — OFFICE VISIT (OUTPATIENT)
Dept: FAMILY MEDICINE | Facility: CLINIC | Age: 60
End: 2025-01-30
Payer: COMMERCIAL

## 2025-01-30 ENCOUNTER — PATIENT MESSAGE (OUTPATIENT)
Dept: FAMILY MEDICINE | Facility: CLINIC | Age: 60
End: 2025-01-30

## 2025-01-30 VITALS
DIASTOLIC BLOOD PRESSURE: 78 MMHG | WEIGHT: 149.81 LBS | OXYGEN SATURATION: 98 % | HEIGHT: 65 IN | HEART RATE: 91 BPM | SYSTOLIC BLOOD PRESSURE: 124 MMHG | BODY MASS INDEX: 24.96 KG/M2

## 2025-01-30 DIAGNOSIS — K75.4 AUTOIMMUNE HEPATITIS: ICD-10-CM

## 2025-01-30 DIAGNOSIS — Z78.0 OSTEOPENIA AFTER MENOPAUSE: ICD-10-CM

## 2025-01-30 DIAGNOSIS — H81.12 BPPV (BENIGN PAROXYSMAL POSITIONAL VERTIGO), LEFT: ICD-10-CM

## 2025-01-30 DIAGNOSIS — Z79.60 LONG-TERM USE OF IMMUNOSUPPRESSANT MEDICATION: ICD-10-CM

## 2025-01-30 DIAGNOSIS — H93.13 TINNITUS OF BOTH EARS: ICD-10-CM

## 2025-01-30 DIAGNOSIS — M85.80 OSTEOPENIA AFTER MENOPAUSE: ICD-10-CM

## 2025-01-30 DIAGNOSIS — R73.03 PREDIABETES: Primary | ICD-10-CM

## 2025-01-30 DIAGNOSIS — N95.2 POST-MENOPAUSE ATROPHIC VAGINITIS: ICD-10-CM

## 2025-01-30 PROCEDURE — 99999 PR PBB SHADOW E&M-EST. PATIENT-LVL IV: CPT | Mod: PBBFAC,,, | Performed by: FAMILY MEDICINE

## 2025-01-30 RX ORDER — ESTRADIOL 10 UG/1
10 TABLET, FILM COATED VAGINAL
Qty: 24 TABLET | Refills: 3 | Status: SHIPPED | OUTPATIENT
Start: 2025-01-30 | End: 2026-01-30

## 2025-01-30 NOTE — PROGRESS NOTES
Labs/Tests:  CBC:  Lab Results   Component Value Date    WBC 6.07 01/15/2025    RBC 4.75 01/15/2025    HGB 14.4 01/15/2025    HCT 44.7 01/15/2025    MCV 94 01/15/2025    MCH 30.3 01/15/2025    MCHC 32.2 01/15/2025    RDW 13.8 01/15/2025     01/15/2025    MPV 9.4 01/15/2025    GRAN 3.1 01/15/2025    GRAN 50.8 01/15/2025    LYMPH 2.0 01/15/2025    LYMPH 32.3 01/15/2025    MONO 0.7 01/15/2025    MONO 10.7 01/15/2025    EOS 0.3 01/15/2025    BASO 0.05 01/15/2025    EOSINOPHIL 5.1 01/15/2025    BASOPHIL 0.8 01/15/2025    DIFFMETHOD Automated 01/15/2025     CMP:  Lab Results   Component Value Date     01/15/2025    K 3.9 01/15/2025     01/15/2025    CO2 26 01/15/2025    BUN 18 01/15/2025    CREATININE 0.8 01/15/2025    GLU 79 01/15/2025    CALCIUM 9.4 01/15/2025    MG 2.2 10/14/2022    PHOS 2.8 02/22/2024    ALKPHOS 48 01/15/2025    PROT 7.1 01/15/2025    ALBUMIN 3.8 01/15/2025    BILITOT 0.4 01/15/2025    AST 35 01/15/2025    ALT 50 (H) 01/15/2025    ANIONGAP 11 01/15/2025    EGFRNORACEVR >60.0 01/15/2025     HA1C:  Lab Results   Component Value Date    HGBA1C 5.4 01/15/2025    ESTIMATEDAVG 108 01/15/2025     LIPIDS:  Lab Results   Component Value Date    CHOL 204 (H) 01/15/2025    TRIG 86 01/15/2025    HDL 60 01/15/2025    LDLCALC 126.8 01/15/2025    CHOLHDL 29.4 01/15/2025    TOTALCHOLEST 3.4 01/15/2025    NONHDLCHOL 144 01/15/2025     Magnesium:  Lab Results   Component Value Date    MG 2.2 10/14/2022     MicroAlbumin/Creatinine Ratio, Urine:  Lab Results   Component Value Date    LABMICR <5.0 01/15/2025    CREATRANDUR 16.0 01/15/2025    MICALBCREAT Unable to calculate 01/15/2025     Iron / TIBC:  Lab Results   Component Value Date    IRON 92 01/15/2025    TRANSFERRIN 286 01/15/2025    TIBC 423 01/15/2025    FESATURATED 22 01/15/2025    FERRITIN 129 01/15/2025     TSH / T3 / T4:  Lab Results   Component Value Date    TSH 1.322 01/15/2025    T3FREE 2.9 01/15/2025    FREET4 1.22 01/15/2025     Vit B /  Vit D:  Lab Results   Component Value Date    FOLATE 16.4 10/14/2022    HIPTAUHU89 1118 (H) 01/15/2025    C82ZMFWBBB 0.11 10/14/2022    [id number] 66 01/15/2025       Subjective:       Patient ID: William Hernandez is a 59 y.o. female.    Chief Complaint: Follow-up (Pt is here for a routine follow up)    Autoimmune hepatitis--stable. Followed by hepatology.    Follow up lab review--Recent labs resulted in Epic were reviewed in detail with patient and all questions answered to his/her satisfaction.    PreDM--In regards to the patient's diabetes, patient denies hypoglycemic symptoms or any symptoms of fluctuating blood glucose.    C/p of postmenopausal vaginal dryness, managed with Vagifem. Requests refill.    Osteopenia--mostly compliant with calcium/magnesium/vit D supplement.    C/o vertigo symptoms, also tinnitus. Some days worse than others.          2025    12:57 PM 2024     8:50 AM 10/10/2022    12:02 PM   Depression Patient Health Questionnaire   Over the last two weeks how often have you been bothered by little interest or pleasure in doing things Not at all Not at all Not at all   Over the last two weeks how often have you been bothered by feeling down, depressed or hopeless Not at all Not at all Not at all   PHQ-2 Total Score 0 0 0          No data to display                Review of Systems   All other systems reviewed and are negative.        Past Medical History:   Diagnosis Date    Abnormal uterine bleeding     Uterine ablation    Allergy     Autoimmune hepatitis     Breast cancer     Bulging lumbar disc     Cancer     Dyspareunia     Menopause, chemo, anti-hormone treatment    Osteoporosis     Personal history of colonic polyps 2023    PER dR [facility] NOTES, IN [place]    Urinary incontinence      Past Surgical History:   Procedure Laterality Date    ABDOMINAL SURGERY      , appendectomy    APPENDECTOMY      AUGMENTATION OF BREAST  2012    Bilateral mastectomy     BILATERAL MASTECTOMY  2011    BREAST SURGERY  2011    Bilateral mastecomy     SECTION  2001    COLONOSCOPY W/ POLYPECTOMY N/A 2023    REPAT IN 5 YRS    masectomy      MASTECTOMY      TONSILLECTOMY      TUBAL LIGATION  2012    Estimated date     Family History   Problem Relation Name Age of Onset    Irritable bowel syndrome Mother Taylor     Arthritis Mother Taylor     Depression Mother Taylor     Hearing loss Mother Taylor     Miscarriages / Stillbirths Mother Taylor     Osteoarthritis Mother Taylor     Migraines Mother Taylor     Heart disease Father Ahsan Storm     Stroke Father Ahsan Storm     Diabetes Paternal Grandmother Robbi Storm        Review of patient's allergies indicates:   Allergen Reactions    Amoxicillin Rash    Cefuroxime axetil Rash    Cephalexin      Other reaction(s): GI Intolerance  UPSET STOMACH  Other reaction(s): GI Intolerance  UPSET STOMACH         Current Outpatient Medications:     calcium carbonate 1250 MG capsule, Take 1,250 mg by mouth., Disp: , Rfl:     calcium citrate-vitamin D3 315-200 mg (CITRACAL+D) 315 mg-5 mcg (200 unit) per tablet, Take 1 tablet by mouth 2 (two) times daily., Disp: , Rfl:     cholecalciferol, vitamin D3, (VITAMIN D3) 25 mcg (1,000 unit) capsule, Take 2,000 Units by mouth., Disp: , Rfl:     diclofenac sodium (VOLTAREN) 1 % Gel, Apply 1 g topically 4 (four) times daily., Disp: 100 g, Rfl: 2    loratadine 10 mg Cap, , Disp: , Rfl:     LORazepam (ATIVAN) 1 MG tablet, Take 0.5 tablets (0.5 mg total) by mouth every 12 (twelve) hours as needed for Anxiety (or restlessness). Can increase to a whole tablet if needed., Disp: 30 tablet, Rfl: 0    predniSONE (DELTASONE) 1 MG tablet, Take 3 mg by mouth once daily. 1 mg x3 by mouth daily Auto immune Hepatits, Disp: , Rfl:     denosumab (PROLIA) 60 mg/mL Syrg, Inject 1 mL (60 mg total) into the skin every 6 (six) months., Disp: 1 mL, Rfl: 1    estradioL (VAGIFEM) 10 mcg  "Tab, Place 1 tablet (10 mcg total) vaginally twice a week., Disp: 24 tablet, Rfl: 3      Objective:      /78 (BP Location: Left arm, Patient Position: Sitting)   Pulse 91   Ht 5' 5" (1.651 m)   Wt 67.9 kg (149 lb 12.8 oz)   SpO2 98%   BMI 24.93 kg/m²   Physical Exam  Vitals and nursing note reviewed.   Constitutional:       General: She is not in acute distress.     Appearance: Normal appearance. She is not ill-appearing, toxic-appearing or diaphoretic.   HENT:      Nose: No congestion.   Eyes:      General: No scleral icterus.        Right eye: No discharge.         Left eye: No discharge.      Conjunctiva/sclera: Conjunctivae normal.   Cardiovascular:      Rate and Rhythm: Normal rate and regular rhythm.      Heart sounds: Normal heart sounds.   Pulmonary:      Effort: Pulmonary effort is normal. No respiratory distress.   Musculoskeletal:      Right lower leg: No edema.      Left lower leg: No edema.   Skin:     General: Skin is warm and dry.      Capillary Refill: Capillary refill takes less than 2 seconds.      Coloration: Skin is not jaundiced or pale.   Neurological:      Mental Status: She is alert and oriented to person, place, and time.   Psychiatric:         Mood and Affect: Mood normal.         Behavior: Behavior normal.         Thought Content: Thought content normal.         Judgment: Judgment normal.         Assessment:       1. Prediabetes    2. BPPV (benign paroxysmal positional vertigo), left    3. Tinnitus of both ears    4. Autoimmune hepatitis    5. Long-term use of immunosuppressant medication    6. Post-menopause atrophic vaginitis    7. Osteopenia after menopause        Plan:       Prediabetes  -     Hemoglobin A1C; Future; Expected date: 01/30/2025  Stable. Continue current treatment. Monitor labs as warranted.    BPPV (benign paroxysmal positional vertigo), left; Tinnitus of both ears  Suggest ENT referral    Autoimmune hepatitis  -     Comprehensive Metabolic Panel; Future; " Expected date: 01/30/2025  Stable. Continue current treatment. Monitor labs as warranted.  Keep follow up with hepatology and rheumatology.    Long-term use of immunosuppressant medication  -     Comprehensive Metabolic Panel; Future; Expected date: 01/30/2025  Monitor labs.    Post-menopause atrophic vaginitis  -     estradioL (VAGIFEM) 10 mcg Tab; Place 1 tablet (10 mcg total) vaginally twice a week.  Dispense: 24 tablet; Refill: 3    Osteopenia after menopause  -     denosumab (PROLIA) 60 mg/mL Syrg; Inject 1 mL (60 mg total) into the skin every 6 (six) months.  Dispense: 1 mL; Refill: 1  She will get Prolia at [facility] over the next 12months.          Previous records in Epic were reviewed, including the last 3 months of encounters, imaging, laboratory, and pathology reports.    Strict return precautions reviewed and patient verbalized understanding. Risks, benefits, and alternatives to the plan were reviewed in detail and all questions answered to the patient's satisfaction. Patient agrees to return to clinic or ER if symptoms worsen. 30 minutes total were spent on today's visit, not limited to but including time based on counseling and coordination of care.    Patient instructed that best way to communicate with my office staff is for patient to get on the [facility] portal to expedite communication and communication issues that may occur.  Patient was given instructions on how to get on the portal.  I encouraged patient to obtain portal access as well.  Ultimately it is up to the patient to obtain access.  Patient voiced understanding.    This note was created using [employer] voice recognition software that occasionally may misinterpret phrases or words.    Follow up in about 6 months (around 7/30/2025) for 6mo follow up.

## 2025-02-10 NOTE — PATIENT INSTRUCTIONS
Andrew Thomas,     If you are due for any health screening(s) below please notify me so we can arrange them to be ordered and scheduled. Most healthy patients at your age complete them, but you are free to accept or refuse.     If you can't do it, I'll definitely understand. If you can, I'd certainly appreciate it!    All of your core healthy metrics are met.

## 2025-07-30 ENCOUNTER — LAB VISIT (OUTPATIENT)
Dept: LAB | Facility: HOSPITAL | Age: 60
End: 2025-07-30
Payer: COMMERCIAL

## 2025-07-30 DIAGNOSIS — Z79.60 LONG-TERM USE OF IMMUNOSUPPRESSANT MEDICATION: ICD-10-CM

## 2025-07-30 DIAGNOSIS — R73.03 PREDIABETES: ICD-10-CM

## 2025-07-30 DIAGNOSIS — K75.4 AUTOIMMUNE HEPATITIS: ICD-10-CM

## 2025-07-30 LAB
ALBUMIN SERPL BCP-MCNC: 4 G/DL (ref 3.5–5.2)
ALP SERPL-CCNC: 60 UNIT/L (ref 40–150)
ALT SERPL W/O P-5'-P-CCNC: 43 UNIT/L (ref 10–44)
ANION GAP (OHS): 6 MMOL/L (ref 8–16)
AST SERPL-CCNC: 78 UNIT/L (ref 11–45)
BILIRUB SERPL-MCNC: 0.5 MG/DL (ref 0.1–1)
BUN SERPL-MCNC: 21 MG/DL (ref 8–23)
CALCIUM SERPL-MCNC: 9.2 MG/DL (ref 8.7–10.5)
CHLORIDE SERPL-SCNC: 105 MMOL/L (ref 95–110)
CO2 SERPL-SCNC: 28 MMOL/L (ref 23–29)
CREAT SERPL-MCNC: 0.8 MG/DL (ref 0.5–1.4)
EAG (OHS): 108 MG/DL (ref 68–131)
GFR SERPLBLD CREATININE-BSD FMLA CKD-EPI: >60 ML/MIN/1.73/M2
GLUCOSE SERPL-MCNC: 78 MG/DL (ref 70–110)
HBA1C MFR BLD: 5.4 % (ref 4–5.6)
POTASSIUM SERPL-SCNC: 4.2 MMOL/L (ref 3.5–5.1)
PROT SERPL-MCNC: 7 GM/DL (ref 6–8.4)
SODIUM SERPL-SCNC: 139 MMOL/L (ref 136–145)

## 2025-07-30 PROCEDURE — 36415 COLL VENOUS BLD VENIPUNCTURE: CPT | Mod: PN

## 2025-07-30 PROCEDURE — 80053 COMPREHEN METABOLIC PANEL: CPT

## 2025-07-30 PROCEDURE — 83036 HEMOGLOBIN GLYCOSYLATED A1C: CPT

## 2025-08-04 ENCOUNTER — OFFICE VISIT (OUTPATIENT)
Dept: FAMILY MEDICINE | Facility: CLINIC | Age: 60
End: 2025-08-04
Payer: COMMERCIAL

## 2025-08-04 VITALS
HEIGHT: 65 IN | DIASTOLIC BLOOD PRESSURE: 74 MMHG | BODY MASS INDEX: 24.44 KG/M2 | OXYGEN SATURATION: 98 % | WEIGHT: 146.69 LBS | SYSTOLIC BLOOD PRESSURE: 122 MMHG | HEART RATE: 70 BPM

## 2025-08-04 DIAGNOSIS — M85.80 OSTEOPENIA AFTER MENOPAUSE: ICD-10-CM

## 2025-08-04 DIAGNOSIS — R59.0 LAD (LYMPHADENOPATHY) OF LEFT CERVICAL REGION: ICD-10-CM

## 2025-08-04 DIAGNOSIS — R73.03 PREDIABETES: ICD-10-CM

## 2025-08-04 DIAGNOSIS — Z78.0 OSTEOPENIA AFTER MENOPAUSE: ICD-10-CM

## 2025-08-04 DIAGNOSIS — Z85.3 HISTORY OF BREAST CANCER: ICD-10-CM

## 2025-08-04 DIAGNOSIS — Z79.60 LONG-TERM USE OF IMMUNOSUPPRESSANT MEDICATION: ICD-10-CM

## 2025-08-04 DIAGNOSIS — E01.0 THYROMEGALY: ICD-10-CM

## 2025-08-04 DIAGNOSIS — K75.4 AUTOIMMUNE HEPATITIS: Primary | ICD-10-CM

## 2025-08-04 PROCEDURE — 1160F RVW MEDS BY RX/DR IN RCRD: CPT | Mod: CPTII,S$GLB,, | Performed by: FAMILY MEDICINE

## 2025-08-04 PROCEDURE — G2211 COMPLEX E/M VISIT ADD ON: HCPCS | Mod: S$GLB,,, | Performed by: FAMILY MEDICINE

## 2025-08-04 PROCEDURE — 99215 OFFICE O/P EST HI 40 MIN: CPT | Mod: S$GLB,,, | Performed by: FAMILY MEDICINE

## 2025-08-04 PROCEDURE — 3074F SYST BP LT 130 MM HG: CPT | Mod: CPTII,S$GLB,, | Performed by: FAMILY MEDICINE

## 2025-08-04 PROCEDURE — 1159F MED LIST DOCD IN RCRD: CPT | Mod: CPTII,S$GLB,, | Performed by: FAMILY MEDICINE

## 2025-08-04 PROCEDURE — 3044F HG A1C LEVEL LT 7.0%: CPT | Mod: CPTII,S$GLB,, | Performed by: FAMILY MEDICINE

## 2025-08-04 PROCEDURE — 3008F BODY MASS INDEX DOCD: CPT | Mod: CPTII,S$GLB,, | Performed by: FAMILY MEDICINE

## 2025-08-04 PROCEDURE — 99999 PR PBB SHADOW E&M-EST. PATIENT-LVL IV: CPT | Mod: PBBFAC,,, | Performed by: FAMILY MEDICINE

## 2025-08-04 PROCEDURE — 3078F DIAST BP <80 MM HG: CPT | Mod: CPTII,S$GLB,, | Performed by: FAMILY MEDICINE

## 2025-08-04 NOTE — PROGRESS NOTES
Labs/Tests:  CBC:  Lab Results   Component Value Date    WBC 6.07 01/15/2025    RBC 4.75 01/15/2025    HGB 14.4 01/15/2025    HCT 44.7 01/15/2025    MCV 94 01/15/2025    MCH 30.3 01/15/2025    MCHC 32.2 01/15/2025    RDW 13.8 01/15/2025     01/15/2025    MPV 9.4 01/15/2025    GRAN 3.1 01/15/2025    GRAN 50.8 01/15/2025    LYMPH 2.0 01/15/2025    LYMPH 32.3 01/15/2025    MONO 0.7 01/15/2025    MONO 10.7 01/15/2025    EOS 0.3 01/15/2025    BASO 0.05 01/15/2025    EOSINOPHIL 5.1 01/15/2025    BASOPHIL 0.8 01/15/2025    DIFFMETHOD Automated 01/15/2025     CMP:  Lab Results   Component Value Date     07/30/2025    K 4.2 07/30/2025     07/30/2025    CO2 28 07/30/2025    BUN 21 07/30/2025    CREATININE 0.8 07/30/2025    GLU 78 07/30/2025    CALCIUM 9.2 07/30/2025    MG 2.2 10/14/2022    PHOS 2.8 02/22/2024    ALKPHOS 60 07/30/2025    PROT 7.0 07/30/2025    ALBUMIN 4.0 07/30/2025    BILITOT 0.5 07/30/2025    AST 78 (H) 07/30/2025    ALT 43 07/30/2025    ANIONGAP 6 (L) 07/30/2025    EGFRNORACEVR >60 07/30/2025     HA1C:  Lab Results   Component Value Date    HGBA1C 5.4 07/30/2025    ESTIMATEDAVG 108 07/30/2025     LIPIDS:  Lab Results   Component Value Date    CHOL 204 (H) 01/15/2025    TRIG 86 01/15/2025    HDL 60 01/15/2025    LDLCALC 126.8 01/15/2025    CHOLHDL 29.4 01/15/2025    TOTALCHOLEST 3.4 01/15/2025    NONHDLCHOL 144 01/15/2025     Magnesium:  Lab Results   Component Value Date    MG 2.2 10/14/2022     MicroAlbumin/Creatinine Ratio, Urine:  Lab Results   Component Value Date    LABMICR <5.0 01/15/2025    CREATRANDUR 16.0 01/15/2025    MICALBCREAT Unable to calculate 01/15/2025     Iron / TIBC:  Lab Results   Component Value Date    IRON 92 01/15/2025    TRANSFERRIN 286 01/15/2025    TIBC 423 01/15/2025    FESATURATED 22 01/15/2025    FERRITIN 129 01/15/2025     TSH / T3 / T4:  Lab Results   Component Value Date    TSH 1.322 01/15/2025    T3FREE 2.9 01/15/2025    FREET4 1.22 01/15/2025     Vit B  / Vit D:  Lab Results   Component Value Date    FOLATE 16.4 10/14/2022    [id number] 1118 (H) 01/15/2025    [id number] 0.11 10/14/2022    [id number] 66 01/15/2025     Subjective:       Patient ID: William Hernandez is a 60 y.o. female.    Chief Complaint: Follow-up    HPI    History of Present Illness    CHIEF COMPLAINT:  Patient presents today for follow up of hip pain and sciatica.  Recent labs.    Recent labs resulted in Epic were reviewed in detail with patient and all questions answered to his/her satisfaction.    LEFT HIP AND SCIATICA PAIN:  She reports left-sided hip and sciatica pain that began after a long road trip and gardening activity. Initially experienced lower back pain that progressed to leg tingling along the sciatic nerve path. Pain is located in the upper buttock area with a cramping sensation. She describes difficulty moving the left hip, which feels occasionally locked. She has ongoing limitations with hip mobility, particularly noting difficulty performing lunges on the left side due to hip tightness. Pain is exacerbated by prolonged sitting during travel and twisting movements while gardening. She has a history of a near-fall on ice years ago that previously caused hip strain and reports chronic hip sensitivity since that incident.    JOINT PAIN:  She reports ongoing joint pain in the great toe that impacts her walking. She currently manages joint pain with Voltaren topical medication, which provides some relief. She acknowledges joint inflammation and recognizes the pain as potentially related to arthritis. The joint pain is intermittent and affects her mobility.    NECK CONCERNS:  She reports neck stiffness with limited range of motion, describing difficulty turning her head. She notes a sensation of asymmetry in the neck region, suggesting a possible glandular abnormality.    AUTOIMMUNE HEPATITIS:  She has a known diagnosis of autoimmune hepatitis. Recent liver ultrasound demonstrates mild  liver parenchymal changes consistent with chronic disease with mild steatosis and presence of gallstones.    CURRENT MEDICATIONS:  She is currently taking Thorn multivitamin for over 50, two capsules in the morning and two at lunch, and vitamin D supplement approximately 2,000 units daily.      ROS:  General: -fever, -chills, -fatigue, -weight gain, -weight loss  Eyes: -vision changes, -redness, -discharge  ENT: -ear pain, -nasal congestion, -sore throat  Cardiovascular: -chest pain, -palpitations, -lower extremity edema  Respiratory: -cough, -shortness of breath  Gastrointestinal: -abdominal pain, -nausea, -vomiting, -diarrhea, -constipation, -blood in stool  Genitourinary: -dysuria, -hematuria, -frequency  Musculoskeletal: +joint pain, -muscle pain, +back pain, +nerve pain, +shooting pain sensation, +neck pain, +pain with movement  Skin: -rash, -lesion  Neurological: -headache, -dizziness, -numbness, +tingling  Psychiatric: -anxiety, -depression, -sleep difficulty  Lymphatics: +swollen lymph nodes         Review of Systems   All other systems reviewed and are negative.        Reviewed family, medical, surgical, and social history.    Objective:      Physical Exam    General: No acute distress. Well-developed. Well-nourished.  Eyes: EOMI. Sclerae anicteric.  HENT: Normocephalic. Atraumatic. Nares patent. Moist oral mucosa.  Ears: Bilateral TMs clear. Bilateral EACs clear.  Cardiovascular: Regular rate. Regular rhythm. No murmurs. No rubs. No gallops. Normal S1, S2.  Respiratory: Normal respiratory effort. Clear to auscultation bilaterally. No rales. No rhonchi. No wheezing.  Abdomen: Soft. Non-tender. Non-distended. Normoactive bowel sounds.  Musculoskeletal: No  obvious deformity.  Extremities: No lower extremity edema.  Neurological: Alert & oriented x3. No slurred speech. Normal gait.  Psychiatric: Normal mood. Normal affect. Good insight. Good judgment.  Skin: Warm. Dry. No rash. Half moons present on  "nails.  Neck: Thyroid at upper limit of normal size. Left cervical lymphadenopathy.       /74 (BP Location: Left arm, Patient Position: Sitting)   Pulse 70   Ht 5' 5" (1.651 m)   Wt 66.6 kg (146 lb 11.5 oz)   SpO2 98%   BMI 24.41 kg/m²   Physical Exam  Vitals and nursing note reviewed.   Constitutional:       General: She is not in acute distress.     Appearance: Normal appearance. She is not ill-appearing, toxic-appearing or diaphoretic.   HENT:      Nose: No congestion.   Eyes:      General: No scleral icterus.        Right eye: No discharge.         Left eye: No discharge.      Conjunctiva/sclera: Conjunctivae normal.   Neck:      Comments: Neck: Thyroid at upper limit of normal size. Left cervical lymphadenopathy.   Cardiovascular:      Rate and Rhythm: Normal rate and regular rhythm.      Heart sounds: Normal heart sounds.   Pulmonary:      Effort: Pulmonary effort is normal. No respiratory distress.   Musculoskeletal:      Cervical back: Neck supple. No tenderness.      Right lower leg: No edema.      Left lower leg: No edema.   Skin:     General: Skin is warm and dry.      Capillary Refill: Capillary refill takes less than 2 seconds.      Coloration: Skin is not jaundiced or pale.      Comments: Half moons present on nails.   Neurological:      Mental Status: She is alert and oriented to person, place, and time.   Psychiatric:         Mood and Affect: Mood normal.         Behavior: Behavior normal.         Thought Content: Thought content normal.         Judgment: Judgment normal.         Assessment:       1. Autoimmune hepatitis    2. LAD (lymphadenopathy) of left cervical region    3. Thyromegaly    4. Prediabetes    5. Osteopenia after menopause    6. Long-term use of immunosuppressant medication    7. History of breast cancer        Plan:       Assessment & Plan    Visit today included increased complexity associated with the care of the episodic problem listed below addressed and managing the " longitudinal care of the patient due to the serious and/or complex managed problem(s) listed below.    ## AUTOIMMUNE HEPATITIS:  - Monitored patient's autoimmune hepatitis with liver enzymes showing AST ranging from 71-85 and ALT down to 43.  - Liver ultrasound reveals slight heterogeneity of the parenchyma suggesting mild steatosis and underlying chronic parenchymal disease, with small asymptomatic gallstones noted.  - Discussed potential viral triggers and immune response mechanisms with patient.  - Prescribed glutathione 500 mg daily, increasing to 500 mg twice daily over 3 months to address inflammation and support detoxification processes.  - Will research potential interaction between glutathione and Prolia injections to determine if supplementation pause is necessary before next injection.  - Ordered hepatic panel in 6 weeks with continued watchful waiting approach given current asymptomatic status.    ## VITAMIN D DEFICIENCY:  - Prescribed vitamin D 30,000-50,000 units daily for 2 weeks, then decreasing to 10,000 units daily to address deficiency and reduce inflammation.  - Educated patient on vitamin D's role as a hormone influencing cortisol, insulin, and thyroid function.  - Explained its fat-soluble nature and potential for improved absorption with concurrent glutathione supplementation.    ## OTHER CONSIDERATIONS:  - Provided information on the relationship between piriformis muscle tightness and sciatic nerve pain.  - Discussed potential neurotoxic effects of alcohol consumption, even in moderate amounts.  - Ordered ultrasound of soft tissue, head and neck, and thyroid to evaluate slight asymmetry noted during physical exam.         1. Autoimmune hepatitis  Stable. Continue current treatment. Monitor labs as warranted.  See above recommendations.  -     Hepatic Function Panel; Future; Expected date: 08/04/2025    2. LAD (lymphadenopathy) of left cervical region; 3. Thyromegaly  Evaluate with US.  -      US Soft Tissue Head Neck; Future; Expected date: 08/04/2025    4. Prediabetes  Stable. Continue current treatment. Monitor labs as warranted.    5. Osteopenia after menopause  Improved from osteoporosis to osteopenia with Prolia. Will continue, [facility] dose [facility]  Written Rx provided.  Continue Calcium with Vit D and magnesium.  Strengthening exercises.  -     denosumab (PROLIA) 60 mg/mL Syrg; Inject 1 mL (60 mg total) into the skin every 6 (six) months.  Dispense: 1 mL; Refill: 1    6. Long-term use of immunosuppressant medication  Stable. Continue current treatment. Monitor labs as warranted.    7. History of breast cancer  UTD on screenings.              Strict return precautions reviewed and patient verbalized understanding. Risks, benefits, and alternatives to the plan were reviewed in detail and all questions answered to the patient's satisfaction. All questions were answered to the fullest satisfaction of the patient, and patient verbalized understanding and agreement to treatment plan. Patient agreed to call with any new or worsening symptoms, or present to the ER.     57 minutes total were spent on today's visit, not limited to but including time based on counseling and coordination of care.    Follow up in about 6 weeks (around 9/15/2025) for recheck LFTs, .      This note was generated with the assistance of ambient listening technology. Verbal consent was obtained by the patient and accompanying visitor(s) for the recording of patient appointment to facilitate this note. I attest to having reviewed and edited the generated note for accuracy, though some syntax or spelling errors may persist. Please contact the author of this note for any clarification.       Ada Arroyo MD

## 2025-08-04 NOTE — PATIENT INSTRUCTIONS
PD VARS Glutathione     Or    Pure Encapsulations     500mg daily for 3 months (maybe even 1000mg daily for the 2-3 month burst)

## 2025-08-06 ENCOUNTER — HOSPITAL ENCOUNTER (OUTPATIENT)
Dept: RADIOLOGY | Facility: HOSPITAL | Age: 60
Discharge: HOME | End: 2025-08-06
Attending: FAMILY MEDICINE
Payer: COMMERCIAL

## 2025-08-06 DIAGNOSIS — R59.0 LAD (LYMPHADENOPATHY) OF LEFT CERVICAL REGION: ICD-10-CM

## 2025-08-06 DIAGNOSIS — E01.0 THYROMEGALY: ICD-10-CM

## 2025-08-06 PROCEDURE — 76536 US EXAM OF HEAD AND NECK: CPT | Mod: TC

## 2025-08-06 PROCEDURE — 76536 US EXAM OF HEAD AND NECK: CPT | Mod: 26,,, | Performed by: RADIOLOGY

## 2025-08-18 ENCOUNTER — PATIENT MESSAGE (OUTPATIENT)
Dept: FAMILY MEDICINE | Facility: CLINIC | Age: 60
End: 2025-08-18
Payer: COMMERCIAL